# Patient Record
Sex: FEMALE | Race: WHITE | NOT HISPANIC OR LATINO | Employment: FULL TIME | URBAN - METROPOLITAN AREA
[De-identification: names, ages, dates, MRNs, and addresses within clinical notes are randomized per-mention and may not be internally consistent; named-entity substitution may affect disease eponyms.]

---

## 2017-07-25 DIAGNOSIS — E66.9 OBESITY: ICD-10-CM

## 2017-10-02 ENCOUNTER — ALLSCRIPTS OFFICE VISIT (OUTPATIENT)
Dept: OTHER | Facility: OTHER | Age: 32
End: 2017-10-02

## 2017-10-02 DIAGNOSIS — G43.109 MIGRAINE WITH AURA AND WITHOUT STATUS MIGRAINOSUS, NOT INTRACTABLE: ICD-10-CM

## 2017-10-03 NOTE — PROGRESS NOTES
Assessment  1  Complicated migraine (8 92) (H09 229)    Plan  Complicated migraine    · * MRI BRAIN WO CONTRAST; Status:Need Information - Financial Authorization; Requested JOANNA:[id number];    · 3 - Jarrell MARTINEZ, Aga Calvo  (Neurology) Co-Management  *  Status: Hold For - Scheduling   Requested for: [id number]  are Referring to a non- Preferred Provider : Services not provided in network  Care Summary provided  : Yes    Discussion/Summary    With her headache history, I am concerned about pseudotumor cerebri  I asked her to call her eye doctor  we will set on an MRI of her brain and have her see neurology  Possible side effects of new medications were reviewed with the patient/guardian today  The treatment plan was reviewed with the patient/guardian  The patient/guardian understands and agrees with the treatment plan      Chief Complaint  PT C/O HEADACHES FOR A COUPLE MONTHS      History of Present Illness  HPI: She c/o HA  They are increasing in severity and frequency  She has a migraine hx, but these are different  She has some vision changes and photophobia  She cannot identify a precipitating event  She take Excedrin with fair relief  She has no aura  She has some facial numbness associated with these  The location and quality of the pain is different  Review of Systems    Constitutional: No fever, no chills, feels well, no tiredness, no recent weight gain or loss  ENT: no ear ache, no loss of hearing, no nosebleeds or nasal discharge, no sore throat or hoarseness  Cardiovascular: no complaints of slow or fast heart rate, no chest pain, no palpitations, no leg claudication or lower extremity edema  Respiratory: no complaints of shortness of breath, no wheezing, no dyspnea on exertion, no orthopnea or PND  Breasts: no complaints of breast pain, breast lump or nipple discharge  Gastrointestinal: no complaints of abdominal pain, no constipation, no nausea or diarrhea, no vomiting, no bloody stools  Genitourinary: no complaints of dysuria, no incontinence, no pelvic pain, no dysmenorrhea, no vaginal discharge or abnormal vaginal bleeding  Musculoskeletal: no complaints of arthralgia, no myalgia, no joint swelling or stiffness, no limb pain or swelling  Integumentary: no complaints of skin rash or lesion, no itching or dry skin, no skin wounds  Neurological: as noted in HPI  Active Problems  1  Anxiety disorder (300 00) (F41 9)   2  Depression screen (V79 0) (Z13 89)   3  Fatigue (780 79) (R53 83)   4  Nontoxic single thyroid nodule (241 0) (E04 1)   5  Obesity (278 00) (E66 9)   6  Overweight (278 02) (E66 3)   7  Palpitations (785 1) (R00 2)   8  Shortness of breath (786 05) (R06 02)   9  Visual disturbances (368 9) (H53 9)   10  Wellness examination (V70 0) (Z00 00)    Past Medical History  1  History of Cough (786 2) (R05)   2  History of Denial Of Any Significant Medical History   3  History of Joint pain, knee (719 46) (M25 569)    Family History  Mother    1  Family history of Arthritis (V17 7)   2  Family history of Thyroid Cancer  Father    3  Family history of Arthritis (V17 7)  Maternal Aunt    4  Family history of Thyroid Cancer    Social History   · History of Current Every Day Smoker (305 1)   · Former smoker (C64 33) (Q35 920)    Surgical History  1  History of Lymphatic Surgery    Current Meds   1  Minastrin 24 Fe CHEW;   Therapy: (Recorded:28Mkl0909) to Recorded   2  Ventolin  (90 Base) MCG/ACT Inhalation Aerosol Solution; INHALE 1 TO 2   PUFFS EVERY 4 TO 6 HOURS AS NEEDED; Therapy: [id number] to (Last Rx:05Jun2015)  Requested for: 58VXU9520 Ordered    Allergies  1   No Known Drug Allergies    Vitals   Recorded: 45RZP1040 02:07PM   Heart Rate 91   Respiration 14   Systolic 693   Diastolic 68   Height 5 ft 6 in   Weight 199 lb 4 oz   BMI Calculated 32 16   BSA Calculated 2   O2 Saturation 97     Physical Exam    Constitutional   General appearance: No acute distress, well appearing and well nourished  Pulmonary   Respiratory effort: No increased work of breathing or signs of respiratory distress  Auscultation of lungs: Clear to auscultation  Cardiovascular   Auscultation of heart: Normal rate and rhythm, normal S1 and S2, without murmurs  Examination of extremities for edema and/or varicosities: Normal     Abdomen   Abdomen: Non-tender, no masses  Liver and spleen: No hepatomegaly or splenomegaly  Musculoskeletal   Gait and station: Normal     Digits and nails: Normal without clubbing or cyanosis  Inspection/palpation of joints, bones, and muscles: Normal     Neurologic   Cranial nerves: Cranial nerves 2-12 intact  Reflexes: 2+ and symmetric  Sensation: No sensory loss  Psychiatric   Orientation to person, place, and time: Normal     Mood and affect: Normal          Results/Data  PHQ-2 Adult Depression Screening [id number] 02:09PM User, [facility]     Test Name Result Flag Reference   PHQ-2 Adult Depression Score 0     Over the last two weeks, how often have you been bothered by any of the following problems?   Little interest or pleasure in doing things: Not at all - 0  Feeling down, depressed, or hopeless: Not at all - 0   PHQ-2 Adult Depression Screening Negative         Signatures   Electronically signed by : Sarai Keller MD; Oct  2 2017  2:36PM EST                       (Author)

## 2018-01-12 VITALS
WEIGHT: 199.25 LBS | RESPIRATION RATE: 14 BRPM | SYSTOLIC BLOOD PRESSURE: 122 MMHG | OXYGEN SATURATION: 97 % | HEIGHT: 66 IN | HEART RATE: 91 BPM | DIASTOLIC BLOOD PRESSURE: 68 MMHG | BODY MASS INDEX: 32.02 KG/M2

## 2018-04-18 RX ORDER — NORETHINDRONE ACETATE AND ETHINYL ESTRADIOL AND FERROUS FUMARATE 1MG-20(24)
1 KIT ORAL DAILY
Refills: 0 | COMMUNITY
Start: 2018-04-12 | End: 2020-04-16 | Stop reason: ALTCHOICE

## 2018-04-18 RX ORDER — ALBUTEROL SULFATE 90 UG/1
1-2 AEROSOL, METERED RESPIRATORY (INHALATION)
COMMUNITY
Start: 2015-06-05 | End: 2018-09-21

## 2018-04-20 ENCOUNTER — OFFICE VISIT (OUTPATIENT)
Dept: FAMILY MEDICINE CLINIC | Facility: CLINIC | Age: 33
End: 2018-04-20
Payer: COMMERCIAL

## 2018-04-20 VITALS
DIASTOLIC BLOOD PRESSURE: 80 MMHG | WEIGHT: 201.2 LBS | BODY MASS INDEX: 32.33 KG/M2 | SYSTOLIC BLOOD PRESSURE: 124 MMHG | OXYGEN SATURATION: 98 % | HEIGHT: 66 IN | HEART RATE: 92 BPM | RESPIRATION RATE: 16 BRPM

## 2018-04-20 DIAGNOSIS — Z23 NEED FOR HEPATITIS A IMMUNIZATION: Primary | ICD-10-CM

## 2018-04-20 DIAGNOSIS — Z71.84 TRAVEL ADVICE ENCOUNTER: Primary | ICD-10-CM

## 2018-04-20 PROCEDURE — 90632 HEPA VACCINE ADULT IM: CPT

## 2018-04-20 PROCEDURE — 3008F BODY MASS INDEX DOCD: CPT | Performed by: FAMILY MEDICINE

## 2018-04-20 PROCEDURE — 99213 OFFICE O/P EST LOW 20 MIN: CPT | Performed by: FAMILY MEDICINE

## 2018-04-20 PROCEDURE — 90471 IMMUNIZATION ADMIN: CPT

## 2018-04-20 RX ORDER — DOXYCYCLINE HYCLATE 100 MG/1
100 CAPSULE ORAL EVERY 12 HOURS SCHEDULED
Qty: 60 CAPSULE | Refills: 0 | Status: SHIPPED | OUTPATIENT
Start: 2018-04-20 | End: 2018-05-20

## 2018-04-20 RX ORDER — CHOLERA VACCINE, LIVE, ORAL 0.4B TO 2B
1 KIT ORAL ONCE
Qty: 1 PACKAGE | Refills: 0 | Status: SHIPPED | OUTPATIENT
Start: 2018-04-20 | End: 2018-04-20

## 2018-04-20 NOTE — PROGRESS NOTES
Assessment/Plan:    No problem-specific Assessment & Plan notes found for this encounter  Diagnoses and all orders for this visit:    Travel advice encounter  -     Cholera Vac Live Attenuated (Charli Hernandes) SUSR; Take 1 ampule by mouth once for 1 dose  -     typhoid live vaccine (VIVOTIF) DR capsule; Take 1 capsule by mouth every other day  -     doxycycline hyclate (VIBRAMYCIN) 100 mg capsule; Take 1 capsule (100 mg total) by mouth every 12 (twelve) hours for 30 days    Other orders  -     MELODETTA 24 FE 1-20 MG-MCG(24) CHEW; Chew 1 tablet daily Chew and swallow  -     albuterol (VENTOLIN HFA) 90 mcg/act inhaler; Inhale 1-2 puffs          Subjective:      Patient ID: Zhen Alvarez is a 34 y o  female  She will be traveling to [facility] in May as part of a mission trip  She is concerned about malaria prophylaxis as well as other infectious Disease  She feels well currently  She has no immunization records  The following portions of the patient's history were reviewed and updated as appropriate:   She  has no past medical history on file  She There are no active problems to display for this patient  She  has a past surgical history that includes Other surgical history  Her family history includes Arthritis in her father and mother; Thyroid cancer in her maternal aunt and mother  She  reports that she has been smoking  She has never used smokeless tobacco  She reports that she does not drink alcohol or use drugs    Current Outpatient Prescriptions   Medication Sig Dispense Refill    MELODETTA 24 FE 1-20 MG-MCG(24) CHEW Chew 1 tablet daily Chew and swallow  0    albuterol (VENTOLIN HFA) 90 mcg/act inhaler Inhale 1-2 puffs      Cholera Vac Live Attenuated (VAXCHORA) SUSR Take 1 ampule by mouth once for 1 dose 1 Package 0    doxycycline hyclate (VIBRAMYCIN) 100 mg capsule Take 1 capsule (100 mg total) by mouth every 12 (twelve) hours for 30 days 60 capsule 0    typhoid live vaccine (VIVOTIF)  capsule Take 1 capsule by mouth every other day 14 capsule 0     No current facility-administered medications for this visit  No current outpatient prescriptions on file prior to visit  No current facility-administered medications on file prior to visit  She has No Known Allergies       Review of Systems   All other systems reviewed and are negative  Objective:      /80 (BP Location: Right arm, Patient Position: Sitting, Cuff Size: Standard)   Pulse 92   Resp 16   Ht 5' 6" (1 676 m)   Wt 91 3 kg (201 lb 3 2 oz)   SpO2 98%   BMI 32 47 kg/m²          Physical Exam   Constitutional: She is oriented to person, place, and time  She appears well-developed and well-nourished  Neck: Normal range of motion  Neck supple  Cardiovascular: Normal rate, regular rhythm, normal heart sounds and intact distal pulses  Pulmonary/Chest: Effort normal and breath sounds normal    Abdominal: Soft  Bowel sounds are normal    Musculoskeletal: Normal range of motion  Neurological: She is alert and oriented to person, place, and time  She has normal reflexes  Skin: Skin is warm and dry  Psychiatric: She has a normal mood and affect  Her behavior is normal  Judgment and thought content normal    Nursing note and vitals reviewed

## 2018-04-20 NOTE — PATIENT INSTRUCTIONS
Doxycycline (By mouth)   Doxycycline (oyq-k-FOG-kleen)  Treats and prevents infections  Also used to prevent malaria and treat rosacea or severe acne  This medicine is a tetracycline antibiotic  Brand Name(s): Acticlate, Adoxa, Adoxa Papo 1/150, Avidoxy, Avidoxy DK, BenzoDox 30 Kit, BenzoDox 60 Kit, Doryx, Doryx MPC, Monodox, Morgidox [id number], Morgidox [id number] Kit, Morgidox 1x50MG, Morgidox 1x50MG Kit, Morgidox [id number]   There may be other brand names for this medicine  When This Medicine Should Not Be Used: This medicine is not right for everyone  Do not use it if you had an allergic reaction to doxycycline or another tetracycline antibiotic, or if you are pregnant or breastfeeding  How to Use This Medicine:   Capsule, Delayed Release Capsule, Long Acting Capsule, Liquid, Tablet, Delayed Release Tablet  · Your doctor will tell you how much medicine to use  Do not use more than directed  · Ask your pharmacist or doctor if you need to take this medicine with or without food  Some forms can be taken with food or milk, but others must be taken on an empty stomach  · Tablets: You may take this medicine with food or milk to avoid stomach irritation  To break a tablet, hold the tablet between your thumb and index fingers close to the appropriate scored line  Then, apply enough pressure to snap the tablet segments apart  Do not use the tablet if it does not break on the scored lines  · Delayed-release tablets: You may also take this medicine by sprinkling the broken tablets onto room-temperature applesauce  Swallow this mixture right away; do not chew  Do not store the mixture for later use  · Oracea® capsules: This medicine must be taken on an empty stomach, at least 1 hour before or 2 hours after a meal   · Capsule: Swallow whole  Do not break, crush, chew, or open it  · Oral liquid: Shake the bottle well just before each use   Measure the oral liquid medicine with a marked measuring spoon, oral syringe, or medicine cup  · Take all of the medicine in your prescription to clear up your infection, even if you feel better after the first few doses  · Drink plenty of fluids to avoid throat problems, if you take the capsule or tablet form  · Malaria prevention: Start taking the medicine 1 or 2 days before you travel  Take the medicine every day during your trip  Keep taking it for 4 weeks after you return  However, do not use the medicine for longer than 4 months  · Do not use this medicine for more than 9 months if you are using it for rosacea  · Use only the brand of medicine your doctor prescribed  Other brands may not work the same way  · Read and follow the patient instructions that come with this medicine  Talk to your doctor or pharmacist if you have any questions  · Missed dose: Take a dose as soon as you remember  If it is almost time for your next dose, wait until then and take a regular dose  Do not take extra medicine to make up for a missed dose  · Store the medicine in a closed container at room temperature, away from heat, moisture, and direct light  Do not freeze the oral liquid  Drugs and Foods to Avoid:   Ask your doctor or pharmacist before using any other medicine, including over-the-counter medicines, vitamins, and herbal products  · Some foods and medicines can affect how doxycycline works  Tell your doctor if you are using any of the following:  ¨ Bismuth subsalicylate, isotretinoin or other acne medicines, acitretin or other medicine to treat psoriasis  ¨ Penicillin antibiotic, birth control pills, medicine for seizures (such as carbamazepine, phenobarbital, phenytoin), stomach medicine, a blood thinner (such as warfarin), or any medicine that contains aluminum, calcium, or iron (such an antacid or vitamin supplement)  Warnings While Using This Medicine:   · This medicine may cause birth defects if either partner is using it during conception or pregnancy   Tell your doctor right away if you or your partner becomes pregnant  Birth control pills may not work as well when used with this medicine  Use a second form of birth control to keep from getting pregnant  · Tell your doctor if you have kidney disease, liver disease, asthma, or an allergy to sulfites  Tell your doctor if you had surgery on your stomach, or if you have a history of yeast infections  · This medicine may cause the following problems:  ¨ Permanent change in tooth color (in children younger than 6years old)  ¨ Increased pressure inside the head  ¨ Yeast infection  ¨ Immune system problems  · This medicine can cause diarrhea  Call your doctor if the diarrhea becomes severe, does not stop, or is bloody  Do not take any medicine to stop diarrhea until you have talked to your doctor  Diarrhea can occur 2 months or more after you stop taking this medicine  · This medicine may make your skin more sensitive to sunlight  Wear sunscreen  Do not use sunlamps or tanning beds  · Tell any doctor or dentist who treats you that you are using this medicine  This medicine may affect certain medical test results  · Call your doctor if your symptoms do not improve or if they get worse  · Keep all medicine out of the reach of children  Never share your medicine with anyone    Possible Side Effects While Using This Medicine:   Call your doctor right away if you notice any of these side effects:  · Allergic reaction: Itching or hives, swelling in your face or hands, swelling or tingling in your mouth or throat, chest tightness, trouble breathing  · Blistering, peeling, red skin rash  · Burning, pain, or irritation in your upper stomach or throat  · Diarrhea that may contain blood  · Fever, chills, cough, runny or stuffy nose, sore throat, and body aches  · Joint pain, fever, rash, and unusual tiredness or weakness  · Severe headache, dizziness, or vision changes  If you notice these less serious side effects, talk with your doctor:   · Darkening of your skin, scars, teeth, or gums  · Sores or white patches on your lips, mouth, or throat  If you notice other side effects that you think are caused by this medicine, tell your doctor  Call your doctor for medical advice about side effects  You may report side effects to FDA at 1-918-FDA-5396  © 2017 [employer] Information is for End User's use only and may not be sold, redistributed or otherwise used for commercial purposes  The above information is an  only  It is not intended as medical advice for individual conditions or treatments  Talk to your doctor, nurse or pharmacist before following any medical regimen to see if it is safe and effective for you

## 2018-09-21 ENCOUNTER — OFFICE VISIT (OUTPATIENT)
Dept: FAMILY MEDICINE CLINIC | Facility: CLINIC | Age: 33
End: 2018-09-21
Payer: COMMERCIAL

## 2018-09-21 VITALS
OXYGEN SATURATION: 99 % | SYSTOLIC BLOOD PRESSURE: 112 MMHG | HEART RATE: 69 BPM | RESPIRATION RATE: 16 BRPM | HEIGHT: 66 IN | TEMPERATURE: 97.9 F | DIASTOLIC BLOOD PRESSURE: 64 MMHG | WEIGHT: 207 LBS | BODY MASS INDEX: 33.27 KG/M2

## 2018-09-21 DIAGNOSIS — N39.0 URINARY TRACT INFECTION WITHOUT HEMATURIA, SITE UNSPECIFIED: ICD-10-CM

## 2018-09-21 DIAGNOSIS — R53.83 FATIGUE, UNSPECIFIED TYPE: ICD-10-CM

## 2018-09-21 DIAGNOSIS — Z00.00 ENCOUNTER FOR MEDICAL EXAMINATION TO ESTABLISH CARE: ICD-10-CM

## 2018-09-21 DIAGNOSIS — G43.909 MIGRAINE WITHOUT STATUS MIGRAINOSUS, NOT INTRACTABLE, UNSPECIFIED MIGRAINE TYPE: Primary | ICD-10-CM

## 2018-09-21 PROBLEM — G43.109 COMPLICATED MIGRAINE: Status: ACTIVE | Noted: 2017-10-02

## 2018-09-21 PROCEDURE — 99204 OFFICE O/P NEW MOD 45 MIN: CPT | Performed by: FAMILY MEDICINE

## 2018-09-21 RX ORDER — SULFAMETHOXAZOLE AND TRIMETHOPRIM 800; 160 MG/1; MG/1
1 TABLET ORAL EVERY 12 HOURS SCHEDULED
Qty: 14 TABLET | Refills: 0 | Status: SHIPPED | OUTPATIENT
Start: 2018-09-21 | End: 2018-09-28

## 2018-09-21 RX ORDER — BUTALBITAL, ACETAMINOPHEN AND CAFFEINE 50; 325; 40 MG/1; MG/1; MG/1
1 TABLET ORAL EVERY 6 HOURS PRN
Qty: 30 TABLET | Refills: 0 | Status: SHIPPED | OUTPATIENT
Start: 2018-09-21 | End: 2018-10-21

## 2018-09-21 NOTE — PROGRESS NOTES
[address]       NAME: Tina Kimble is a 34 y o  female  : 1985    MRN: [id number]  DATE: 2018  TIME: 10:14 AM    Assessment and Plan   Diagnoses and all orders for this visit:    Migraine without status migrainosus, not intractable, unspecified migraine type  -     butalbital-acetaminophen-caffeine (FIORICET,ESGIC) -40 mg per tablet; Take 1 tablet by mouth every 6 (six) hours as needed for migraine for up to 30 days    Encounter for medical examination to establish care    Urinary tract infection without hematuria, site unspecified  -     sulfamethoxazole-trimethoprim (BACTRIM DS) 800-160 mg per tablet; Take 1 tablet by mouth every 12 (twelve) hours for 7 days    Fatigue, unspecified type  -     TSH, 3rd generation with Free T4 reflex; Future  -     HEMOGLOBIN A1C W/ EAG ESTIMATION; Future        No problem-specific Assessment & Plan notes found for this encounter  Patient Instructions   Reviewed UA results from 1 week ago which did show UTI will treat with bactrim   Will try fiorcet for chronic migraines    ordered further labs to eval fatigue and follow up in 6 months        Chief Complaint     Chief Complaint   Patient presents with    Follow-up     review labs         History of Present Illness       34 yea old female at office with chief complaint of increased fatigue for the past few months  She does have a history of migraiens she states she will get them 2-3 times per month she had been using Excedrin migaine which is no longer working for her at this time  Did bring in lab results from her employer and wanted to review them at this time, she has a U/A CBC and CMP available for review  Migraine    This is a chronic problem  The current episode started more than 1 year ago  The problem occurs intermittently  The problem has been waxing and waning  The pain is located in the right unilateral region  The pain does not radiate   The pain quality is similar to prior headaches  The quality of the pain is described as shooting  The pain is at a severity of 0/10  The patient is experiencing no pain  Associated symptoms include blurred vision, dizziness, nausea, phonophobia and photophobia  Pertinent negatives include no abdominal pain, abnormal behavior, anorexia, back pain, coughing, drainage, ear pain, eye pain, eye redness, eye watering, facial sweating, fever, hearing loss, insomnia, loss of balance, muscle aches, neck pain, numbness, rhinorrhea, scalp tenderness, seizures, sinus pressure, sore throat, swollen glands, tingling, tinnitus, visual change, vomiting, weakness or weight loss  Nothing aggravates the symptoms  She has tried NSAIDs for the symptoms  The treatment provided mild relief  Fatigue   This is a new problem  The current episode started more than 1 month ago  The problem occurs intermittently  The problem has been waxing and waning  Associated symptoms include fatigue, headaches and nausea  Pertinent negatives include no abdominal pain, anorexia, arthralgias, change in bowel habit, chest pain, chills, congestion, coughing, diaphoresis, fever, joint swelling, myalgias, neck pain, numbness, rash, sore throat, swollen glands, urinary symptoms, vertigo, visual change, vomiting or weakness  Nothing aggravates the symptoms  She has tried nothing for the symptoms  The treatment provided no relief  Review of Systems   Review of Systems   Constitutional: Positive for fatigue  Negative for chills, diaphoresis, fever and weight loss  HENT: Negative  Negative for congestion, ear pain, hearing loss, rhinorrhea, sinus pressure, sore throat and tinnitus  Eyes: Positive for blurred vision and photophobia  Negative for pain and redness  Respiratory: Negative  Negative for cough  Cardiovascular: Negative  Negative for chest pain  Gastrointestinal: Positive for nausea  Negative for abdominal pain, anorexia, change in bowel habit and vomiting  Endocrine: Negative  Genitourinary: Negative  Musculoskeletal: Negative  Negative for arthralgias, back pain, joint swelling, myalgias and neck pain  Skin: Negative  Negative for rash  Allergic/Immunologic: Negative  Neurological: Positive for dizziness and headaches  Negative for vertigo, tingling, seizures, weakness, numbness and loss of balance  Hematological: Negative  Psychiatric/Behavioral: Negative  The patient does not have insomnia  All other systems reviewed and are negative  Current Medications       Current Outpatient Prescriptions:     butalbital-acetaminophen-caffeine (FIORICET,ESGIC) -40 mg per tablet, Take 1 tablet by mouth every 6 (six) hours as needed for migraine for up to 30 days, Disp: 30 tablet, Rfl: 0    MELODETTA 24 FE 1-20 MG-MCG(24) CHEW, Chew 1 tablet daily Chew and swallow, Disp: , Rfl: 0    sulfamethoxazole-trimethoprim (BACTRIM DS) 800-160 mg per tablet, Take 1 tablet by mouth every 12 (twelve) hours for 7 days, Disp: 14 tablet, Rfl: 0    Current Allergies     Allergies as of 09/21/2018    (No Known Allergies)            The following portions of the patient's history were reviewed and updated as appropriate: allergies, current medications, past family history, past medical history, past social history, past surgical history and problem list      No past medical history on file  Past Surgical History:   Procedure Laterality Date    OTHER SURGICAL HISTORY      Lymphatic surgery       Family History   Problem Relation Age of Onset    Arthritis Mother     Thyroid cancer Mother     Arthritis Father     Thyroid cancer Maternal Aunt          Medications have been verified  Objective   /64   Pulse 69   Temp 97 9 °F (36 6 °C)   Resp 16   Ht 5' 6" (1 676 m)   Wt 93 9 kg (207 lb)   SpO2 99%   BMI 33 41 kg/m²        Physical Exam     Physical Exam   Constitutional: She is oriented to person, place, and time   Vital signs are normal  She appears well-developed  HENT:   Head: Normocephalic and atraumatic  Right Ear: External ear normal    Left Ear: External ear normal    Nose: Nose normal    Mouth/Throat: Oropharynx is clear and moist    Eyes: Conjunctivae and EOM are normal  Pupils are equal, round, and reactive to light  Lids are everted and swept, no foreign bodies found  Neck: Normal range of motion  Neck supple  Cardiovascular: Normal rate, regular rhythm, normal heart sounds and intact distal pulses  Pulmonary/Chest: Effort normal and breath sounds normal    Abdominal: Soft  Normal appearance and bowel sounds are normal    Musculoskeletal: Normal range of motion  Neurological: She is alert and oriented to person, place, and time  She has normal reflexes  Skin: Skin is warm, dry and intact  Psychiatric: She has a normal mood and affect  Her speech is normal and behavior is normal  Judgment and thought content normal    Nursing note and vitals reviewed

## 2019-04-12 ENCOUNTER — OFFICE VISIT (OUTPATIENT)
Dept: FAMILY MEDICINE CLINIC | Facility: CLINIC | Age: 34
End: 2019-04-12
Payer: COMMERCIAL

## 2019-04-12 VITALS
TEMPERATURE: 99.3 F | OXYGEN SATURATION: 98 % | BODY MASS INDEX: 35.68 KG/M2 | HEART RATE: 83 BPM | HEIGHT: 66 IN | WEIGHT: 222 LBS | RESPIRATION RATE: 18 BRPM | SYSTOLIC BLOOD PRESSURE: 119 MMHG | DIASTOLIC BLOOD PRESSURE: 72 MMHG

## 2019-04-12 DIAGNOSIS — Z09 FOLLOW UP: ICD-10-CM

## 2019-04-12 DIAGNOSIS — G43.109 COMPLICATED MIGRAINE: Primary | ICD-10-CM

## 2019-04-12 PROCEDURE — 99213 OFFICE O/P EST LOW 20 MIN: CPT | Performed by: FAMILY MEDICINE

## 2020-02-13 ENCOUNTER — TELEPHONE (OUTPATIENT)
Dept: FAMILY MEDICINE CLINIC | Facility: CLINIC | Age: 35
End: 2020-02-13

## 2020-02-14 ENCOUNTER — TELEPHONE (OUTPATIENT)
Dept: FAMILY MEDICINE CLINIC | Facility: CLINIC | Age: 35
End: 2020-02-14

## 2020-02-14 NOTE — TELEPHONE ENCOUNTER
Patient called [facility] requesting an appointment due to she was in the hospital yesterday diagnosed with TIA, states she signed herself out against Medical Advice  Informed the patient that she would need to contact her PCP office at [address] on Monday when the office is open to schedule follow up appointment

## 2020-02-24 ENCOUNTER — OFFICE VISIT (OUTPATIENT)
Dept: FAMILY MEDICINE CLINIC | Facility: CLINIC | Age: 35
End: 2020-02-24
Payer: COMMERCIAL

## 2020-02-24 VITALS
RESPIRATION RATE: 18 BRPM | HEART RATE: 94 BPM | WEIGHT: 220 LBS | BODY MASS INDEX: 35.36 KG/M2 | OXYGEN SATURATION: 98 % | SYSTOLIC BLOOD PRESSURE: 128 MMHG | HEIGHT: 66 IN | TEMPERATURE: 99.4 F | DIASTOLIC BLOOD PRESSURE: 96 MMHG

## 2020-02-24 DIAGNOSIS — F41.8 DEPRESSION WITH ANXIETY: Primary | ICD-10-CM

## 2020-02-24 DIAGNOSIS — G43.009 MIGRAINE WITHOUT AURA AND WITHOUT STATUS MIGRAINOSUS, NOT INTRACTABLE: ICD-10-CM

## 2020-02-24 PROCEDURE — 99214 OFFICE O/P EST MOD 30 MIN: CPT | Performed by: FAMILY MEDICINE

## 2020-02-24 RX ORDER — ESCITALOPRAM OXALATE 10 MG/1
10 TABLET ORAL DAILY
Qty: 30 TABLET | Refills: 2 | Status: SHIPPED | OUTPATIENT
Start: 2020-02-24 | End: 2020-03-09 | Stop reason: ALTCHOICE

## 2020-02-24 NOTE — PROGRESS NOTES
Assessment/Plan:     Diagnoses and all orders for this visit:    Depression with anxiety  -     Ambulatory referral to [facility]; Future  -     escitalopram (LEXAPRO) 10 mg tablet; Take 1 tablet (10 mg total) by mouth daily    Migraine without aura and without status migrainosus, not intractable  -     Ambulatory referral to Neurology; Future    Other orders  -     Cancel: TDAP Vaccine greater than or equal to 8yo  -     Cancel: influenza vaccine, [id number], quadrivalent, 0 5 mL, preservative-free, for adult and pediatric patients 6 mos+ (AFLURIA, FLUARIX, FLULAVAL, FLUZONE)        - Imaging and labs from hospital reviewed with no significant findings  Patient hesitant to start any medication for migraines  Will refer to neurology for further workup and management of migraines and possible TIA  - Recommended ibuprofen or tylenol as needed for headaches  - Will start Lexapro 10mg daily for depression/anxiety  Referral given for counseling  Follow up in one month  - Patient declines vaccines today  Return in about 1 month (around 3/24/2020) for Next scheduled follow up  Subjective:        Patient ID: Ned Gerard is a 30 y o  female  Chief Complaint   Patient presents with    Follow-up     increased fatigue and headaches since hospital visit   Anxiety     increased in last 6 months       Laura Boyle is a 30year old female with history of migraines and anxiety, presenting for hospital follow up  Patient was seen at [facility] on on 2/13 for LUE numbness and blurred vision with suspected TIA, however, she signed out AMA  Imaging including EKG, CT head, MRI head, and CXR were normal   Bilateral carotid US showed less than 50% stenosis  History of migraines occuring about 1x weekly  Migraines are associated with nausea and facial numbness/tingling  Denies photophobia, phonophobia  Denies aura  Takes ibuprofen or Excedrin as needed    Was offered Fioricet in the past but did not take this due to fear of side effects  Today patient is concerned for increasing anxiety over the past 6 months  Associated symptoms include over-thinking, palpitations and chest tightness, and increased fatigue  States there has been a lot of death in her family over the past few months  She has been feeling more fatigued and takes 2-3 naps per day  Denies appetite change  Has not been on medication for anxiety or depression in the past   Denies SI/HI  The following portions of the patient's history were reviewed and updated as appropriate: allergies, current medications, past family history, past medical history, past social history, past surgical history and problem list     Patient Active Problem List   Diagnosis    Anxiety disorder    Complicated migraine    Fatigue    Nontoxic single thyroid nodule    Class 2 obesity due to excess calories without serious comorbidity with body mass index (BMI) of 35 0 to 35 9 in adult    Palpitations    Visual disturbances       Current Outpatient Medications   Medication Sig Dispense Refill    MELODETTA 24 FE 1-20 MG-MCG(24) CHEW Chew 1 tablet daily Chew and swallow  0    escitalopram (LEXAPRO) 10 mg tablet Take 1 tablet (10 mg total) by mouth daily 30 tablet 2     No current facility-administered medications for this visit           Past Medical History:   Diagnosis Date    Disease of thyroid gland         Past Surgical History:   Procedure Laterality Date    OTHER SURGICAL HISTORY      Lymphatic surgery        Social History     Socioeconomic History    Marital status: Unknown     Spouse name: Not on file    Number of children: Not on file    Years of education: Not on file    Highest education level: Not on file   Occupational History    Not on file   Social Needs    Financial resource strain: Not on file    Food insecurity:     Worry: Not on file     Inability: Not on file    Transportation needs:     Medical: Not on file     Non-medical: Not on file   Tobacco Use    Smoking status: Current Every Day Smoker     Packs/day: 0 50     Types: Cigarettes    Smokeless tobacco: Never Used    Tobacco comment: per allscripts; former smoker   Substance and Sexual Activity    Alcohol use: Not Currently     Comment: social    Drug use: No    Sexual activity: Yes   Lifestyle    Physical activity:     Days per week: Not on file     Minutes per session: Not on file    Stress: Not on file   Relationships    Social connections:     Talks on phone: Not on file     Gets together: Not on file     Attends [employer] service: Not on file     Active member of club or organization: Not on file     Attends meetings of clubs or organizations: Not on file     Relationship status: Not on file    Intimate partner violence:     Fear of current or ex partner: Not on file     Emotionally abused: Not on file     Physically abused: Not on file     Forced sexual activity: Not on file   Other Topics Concern    Not on file   Social History Narrative    Not on file        Review of Systems   Constitutional: Positive for fatigue  Negative for appetite change, chills, diaphoresis, fever and unexpected weight change  HENT: Negative for congestion, ear pain, postnasal drip, rhinorrhea, sinus pressure, sinus pain, sore throat and trouble swallowing  Eyes: Negative for photophobia, pain, discharge, redness, itching and visual disturbance  Respiratory: Negative for cough, chest tightness, shortness of breath and wheezing  Cardiovascular: Negative for chest pain, palpitations and leg swelling  Gastrointestinal: Negative for abdominal pain, constipation, diarrhea, nausea and vomiting  Genitourinary: Negative for difficulty urinating, dysuria and hematuria  Musculoskeletal: Negative for arthralgias, back pain, gait problem, joint swelling, myalgias, neck pain and neck stiffness  Skin: Negative for rash and wound  Neurological: Positive for headaches   Negative for dizziness, syncope, weakness, light-headedness and numbness  Psychiatric/Behavioral: Positive for decreased concentration, dysphoric mood and sleep disturbance  Negative for hallucinations, self-injury and suicidal ideas  The patient is nervous/anxious and is hyperactive  Objective:      /96 (BP Location: Left arm, Patient Position: Sitting, Cuff Size: Adult)   Pulse 94   Temp 99 4 °F (37 4 °C) (Temporal)   Resp 18   Ht 5' 6" (1 676 m)   Wt 99 8 kg (220 lb)   SpO2 98%   BMI 35 51 kg/m²          Physical Exam   Constitutional: She is oriented to person, place, and time  She appears well-developed and well-nourished  No distress  Tearful   HENT:   Head: Normocephalic and atraumatic  Eyes: Pupils are equal, round, and reactive to light  Conjunctivae and EOM are normal    Neck: Normal range of motion  Neck supple  Cardiovascular: Normal rate, regular rhythm and normal heart sounds  No murmur heard  Pulmonary/Chest: Effort normal and breath sounds normal  No respiratory distress  She has no wheezes  Musculoskeletal: Normal range of motion  She exhibits no edema  Lymphadenopathy:     She has no cervical adenopathy  Neurological: She is alert and oriented to person, place, and time  No cranial nerve deficit or sensory deficit  Coordination normal    Skin: Skin is warm and dry  Nursing note and vitals reviewed

## 2020-03-09 ENCOUNTER — OFFICE VISIT (OUTPATIENT)
Dept: FAMILY MEDICINE CLINIC | Facility: CLINIC | Age: 35
End: 2020-03-09
Payer: COMMERCIAL

## 2020-03-09 VITALS
WEIGHT: 222 LBS | BODY MASS INDEX: 35.68 KG/M2 | OXYGEN SATURATION: 97 % | TEMPERATURE: 99.1 F | RESPIRATION RATE: 18 BRPM | SYSTOLIC BLOOD PRESSURE: 118 MMHG | HEIGHT: 66 IN | HEART RATE: 94 BPM | DIASTOLIC BLOOD PRESSURE: 76 MMHG

## 2020-03-09 DIAGNOSIS — Z86.39 HISTORY OF HASHIMOTO THYROIDITIS: ICD-10-CM

## 2020-03-09 DIAGNOSIS — F41.9 ANXIETY DISORDER, UNSPECIFIED TYPE: Primary | ICD-10-CM

## 2020-03-09 PROCEDURE — 87389 HIV-1 AG W/HIV-1&-2 AB AG IA: CPT | Performed by: FAMILY MEDICINE

## 2020-03-09 PROCEDURE — 84443 ASSAY THYROID STIM HORMONE: CPT | Performed by: FAMILY MEDICINE

## 2020-03-09 PROCEDURE — 84481 FREE ASSAY (FT-3): CPT | Performed by: FAMILY MEDICINE

## 2020-03-09 PROCEDURE — 84439 ASSAY OF FREE THYROXINE: CPT | Performed by: FAMILY MEDICINE

## 2020-03-09 PROCEDURE — 99213 OFFICE O/P EST LOW 20 MIN: CPT | Performed by: FAMILY MEDICINE

## 2020-03-09 RX ORDER — SERTRALINE HYDROCHLORIDE 25 MG/1
25 TABLET, FILM COATED ORAL DAILY
Qty: 30 TABLET | Refills: 1 | Status: SHIPPED | OUTPATIENT
Start: 2020-03-09 | End: 2020-04-09

## 2020-03-09 RX ORDER — HYDROXYZINE HYDROCHLORIDE 10 MG/1
10 TABLET, FILM COATED ORAL 3 TIMES DAILY PRN
Qty: 90 TABLET | Refills: 0 | Status: SHIPPED | OUTPATIENT
Start: 2020-03-09 | End: 2021-12-09 | Stop reason: ALTCHOICE

## 2020-03-09 NOTE — PROGRESS NOTES
Assessment/Plan:     Diagnoses and all orders for this visit:    Anxiety disorder, unspecified type  -     HIV 1/2 Antigen/Antibody (4th Generation) w Reflex [facility]  -     hydrOXYzine HCL (ATARAX) 10 mg tablet; Take 1 tablet (10 mg total) by mouth 3 (three) times a day as needed for anxiety  -     TSH, 3rd generation  -     T3, free  -     T4, free  -     Thyroid Antibodies Panel  -     sertraline (ZOLOFT) 25 mg tablet; Take 1 tablet (25 mg total) by mouth daily    History of Hashimoto thyroiditis  -     TSH, 3rd generation  -     T3, free  -     T4, free  -     Thyroid Antibodies Panel    Other orders  -     Cancel: Tdap vaccine greater than or equal to 6yo IM           Start zoloft 25mg daily with hydroxyzine PRN acute sx  Supportive measures discussed  Awaiting therapy apt  FMLA forms completed  Check thyroid labs and F/U neurology as scheduled  Return in about 3 weeks (around 3/30/2020), or if symptoms worsen or fail to improve, for follow up as previously scheduled, and follow up with specialist as directed  Subjective:        Patient ID: Chandrika Gunter is a 30 y o  female  Chief Complaint   Patient presents with   Amber Cho / [employer] paperwork       Patient is a 30year old female who presents to the office today for anxiety follow up  She has been experiencing worsening anxiety and stress since September, experiences chest tightness, palpitations, over thinking, tense, stressed  She denies feeling down, depressed, hopeless  She reports constant fatigue, tearfulness, decreased concentration  She denies SI/HI/AH/VH/manic sx  She reports feeling overwhelmed and does not feel like herself  She reports sleeping constantly, hypersomnia  She reports appetite is decreased, eating once/day  She works at [employer], and has been unable to work due to her symptoms  She reports high anxiety, cannot concentrate  She has been unable to plan projects or coordinate objectives for work   She is excessively fatigued, crying all the time  She is awaiting apt for therapy  Her symptoms have worsened since her hospital visit for possible TIA, worrying about having a stroke  She took the lexapro and reports it did not agree with her  She reports that medication "calmed her down way too much " She reports she was having decreased heart rate and irregular heart beat  The following portions of the patient's history were reviewed and updated as appropriate: allergies, current medications, past family history, past medical history, past social history, past surgical history and problem list      Patient Active Problem List   Diagnosis    Anxiety disorder    Complicated migraine    Fatigue    Nontoxic single thyroid nodule    Class 2 obesity due to excess calories without serious comorbidity with body mass index (BMI) of 35 0 to 35 9 in adult    Palpitations    Visual disturbances       Current Outpatient Medications   Medication Sig Dispense Refill    MELODETTA 24 FE 1-20 MG-MCG(24) CHEW Chew 1 tablet daily Chew and swallow  0    hydrOXYzine HCL (ATARAX) 10 mg tablet Take 1 tablet (10 mg total) by mouth 3 (three) times a day as needed for anxiety 90 tablet 0    sertraline (ZOLOFT) 25 mg tablet Take 1 tablet (25 mg total) by mouth daily 30 tablet 1     No current facility-administered medications for this visit           Past Medical History:   Diagnosis Date    Disease of thyroid gland         Past Surgical History:   Procedure Laterality Date    OTHER SURGICAL HISTORY      Lymphatic surgery        Social History     Socioeconomic History    Marital status: Unknown     Spouse name: Not on file    Number of children: Not on file    Years of education: Not on file    Highest education level: Not on file   Occupational History    Not on file   Social Needs    Financial resource strain: Not on file    Food insecurity:     Worry: Not on file     Inability: Not on file    Transportation needs: Medical: Not on file     Non-medical: Not on file   Tobacco Use    Smoking status: Current Every Day Smoker     Packs/day: 0 50     Types: Cigarettes    Smokeless tobacco: Never Used    Tobacco comment: per allscripts; former smoker   Substance and Sexual Activity    Alcohol use: Not Currently     Comment: social    Drug use: No    Sexual activity: Yes   Lifestyle    Physical activity:     Days per week: Not on file     Minutes per session: Not on file    Stress: Not on file   Relationships    Social connections:     Talks on phone: Not on file     Gets together: Not on file     Attends [employer] service: Not on file     Active member of club or organization: Not on file     Attends meetings of clubs or organizations: Not on file     Relationship status: Not on file    Intimate partner violence:     Fear of current or ex partner: Not on file     Emotionally abused: Not on file     Physically abused: Not on file     Forced sexual activity: Not on file   Other Topics Concern    Not on file   Social History Narrative    Not on file        Review of Systems   Constitutional: Positive for appetite change and fatigue  Negative for activity change, chills, diaphoresis, fever and unexpected weight change  Respiratory: Negative  Cardiovascular: Positive for palpitations  Negative for chest pain and leg swelling  Gastrointestinal: Negative  Psychiatric/Behavioral: Positive for decreased concentration, dysphoric mood and sleep disturbance  Negative for agitation, behavioral problems, confusion, hallucinations, self-injury and suicidal ideas  The patient is nervous/anxious  The patient is not hyperactive            Objective:      /76 (BP Location: Left arm, Patient Position: Sitting, Cuff Size: Large)   Pulse 94   Temp 99 1 °F (37 3 °C) (Temporal)   Resp 18   Ht 5' 6" (1 676 m)   Wt 101 kg (222 lb)   SpO2 97%   BMI 35 83 kg/m²          Physical Exam   Constitutional: She is oriented to person, place, and time  She appears well-developed and well-nourished  HENT:   Head: Normocephalic and atraumatic  Eyes: Pupils are equal, round, and reactive to light  Conjunctivae are normal    Neck: Neck supple  Cardiovascular: Normal rate, regular rhythm and normal heart sounds  Pulmonary/Chest: Effort normal and breath sounds normal    Neurological: She is alert and oriented to person, place, and time  Skin: Skin is warm and dry  Capillary refill takes less than 2 seconds  Psychiatric: Her speech is normal and behavior is normal  Judgment and thought content normal  Her mood appears anxious

## 2020-03-10 ENCOUNTER — TELEPHONE (OUTPATIENT)
Dept: BEHAVIORAL/MENTAL HEALTH CLINIC | Facility: HOME HEALTHCARE | Age: 35
End: 2020-03-10

## 2020-03-10 LAB
T3FREE SERPL-MCNC: 3.02 PG/ML (ref 2.3–4.2)
T4 FREE SERPL-MCNC: 1.03 NG/DL (ref 0.76–1.46)
TSH SERPL DL<=0.05 MIU/L-ACNC: 0.86 UIU/ML (ref 0.36–3.74)

## 2020-03-11 LAB — HIV 1+2 AB+HIV1 P24 AG SERPL QL IA: NORMAL

## 2020-03-30 ENCOUNTER — TELEMEDICINE (OUTPATIENT)
Dept: FAMILY MEDICINE CLINIC | Facility: HOME HEALTHCARE | Age: 35
End: 2020-03-30

## 2020-03-30 DIAGNOSIS — F41.9 ANXIETY DISORDER, UNSPECIFIED TYPE: Primary | ICD-10-CM

## 2020-03-30 NOTE — PROGRESS NOTES
Virtual Brief Visit    Problem List Items Addressed This Visit        Other    Anxiety disorder - Primary        - Continue Zoloft 25 mg daily   - Follow up with neurology as scheduled  - Patient given [place] fax number and advised to send [facility] paperwork to be reviewed  - Follow up in 1 month  Reason for visit is follow up anxiety  Encounter provider Ayde Shook PA-C    Provider located at [address] [address]  [phone]      Recent Visits  No visits were found meeting these conditions  Showing recent visits within past 7 days and meeting all other requirements     Today's Visits  Date Type Provider Dept   03/30/20 [address] [place] [address], JB Mcnulty [facility] today's visits and meeting all other requirements     Future Appointments  Date Type Provider Dept   03/30/20 Telemedicine JB Tello 1   Showing future appointments within next 150 days and meeting all other requirements        After connecting through telephone and patient was informed that this is not a secure, HIPAA-complaint platform  she agrees to proceed  , the patient was identified by name and date of birth  Savannah Claudio was informed that this is a telemedicine visit and that the visit is being conducted through telephone and patient was informed that this is not a secure, HIPAA-complaint platform  she agrees to proceed     My office door was closed  No one else was in the room  She acknowledged consent and understanding of privacy and security of the video platform  The patient has agreed to participate and understands they can discontinue the visit at any time  Patient is aware this is a billable service  Subjective  Savannah Claudio is a 30 y o  female presenting for follow up of anxiety  Patient was initially started on lexapro on 2/24 but stopped this medication due to irregular heart beat  Was then switched to Zoloft 25mg daily on 3/9  Reports improvement of anxiety symptoms since starting the medication  Denies irregular heart beat or chest pain with this medication  Patient saw neurology on 3/23 for follow up of suspected TIA in February  Neuro recommended daily aspirin 81mg and ordered additional testing to include home sleep study, MRI brain, MRA neck, and ECHO to rule out underlying cause  Patient is requesting an extension of her FMLA due to the additional testing she needs to obtain per neuro  Past Medical History:   Diagnosis Date    Disease of thyroid gland        Past Surgical History:   Procedure Laterality Date    OTHER SURGICAL HISTORY      Lymphatic surgery       Current Outpatient Medications   Medication Sig Dispense Refill    hydrOXYzine HCL (ATARAX) 10 mg tablet Take 1 tablet (10 mg total) by mouth 3 (three) times a day as needed for anxiety 90 tablet 0    MELODETTA 24 FE 1-20 MG-MCG(24) CHEW Chew 1 tablet daily Chew and swallow  0    sertraline (ZOLOFT) 25 mg tablet Take 1 tablet (25 mg total) by mouth daily 30 tablet 1     No current facility-administered medications for this visit  Allergies   Allergen Reactions    Shellfish-Derived Products Anaphylaxis       Review of Systems   Constitutional: Negative for chills, diaphoresis and fever  HENT: Negative for congestion, ear pain and sore throat  Eyes: Negative for visual disturbance  Respiratory: Negative for cough, chest tightness and shortness of breath  Cardiovascular: Negative for chest pain, palpitations and leg swelling  Gastrointestinal: Negative for abdominal pain, blood in stool, constipation, diarrhea, nausea and vomiting  Neurological: Negative for dizziness, syncope, weakness, light-headedness and numbness  Psychiatric/Behavioral: Negative for decreased concentration, dysphoric mood, self-injury and suicidal ideas  The patient is nervous/anxious (improving)            I spent 20 minutes with the patient during this visit

## 2020-04-07 DIAGNOSIS — F41.9 ANXIETY DISORDER, UNSPECIFIED TYPE: ICD-10-CM

## 2020-04-09 RX ORDER — SERTRALINE HYDROCHLORIDE 25 MG/1
TABLET, FILM COATED ORAL
Qty: 30 TABLET | Refills: 1 | Status: SHIPPED | OUTPATIENT
Start: 2020-04-09 | End: 2020-04-30

## 2020-04-10 ENCOUNTER — TELEPHONE (OUTPATIENT)
Dept: FAMILY MEDICINE CLINIC | Facility: CLINIC | Age: 35
End: 2020-04-10

## 2020-04-15 ENCOUNTER — TELEPHONE (OUTPATIENT)
Dept: BEHAVIORAL/MENTAL HEALTH CLINIC | Facility: HOME HEALTHCARE | Age: 35
End: 2020-04-15

## 2020-04-16 ENCOUNTER — TELEMEDICINE (OUTPATIENT)
Dept: FAMILY MEDICINE CLINIC | Facility: HOME HEALTHCARE | Age: 35
End: 2020-04-16
Payer: COMMERCIAL

## 2020-04-16 DIAGNOSIS — F41.9 ANXIETY DISORDER, UNSPECIFIED TYPE: ICD-10-CM

## 2020-04-16 DIAGNOSIS — K21.9 GASTROESOPHAGEAL REFLUX DISEASE WITHOUT ESOPHAGITIS: Primary | ICD-10-CM

## 2020-04-16 PROCEDURE — G0071 COMM SVCS BY RHC/FQHC 5 MIN: HCPCS | Performed by: FAMILY MEDICINE

## 2020-04-16 RX ORDER — ASPIRIN 81 MG/1
81 TABLET, CHEWABLE ORAL DAILY
COMMUNITY

## 2020-04-16 RX ORDER — OMEPRAZOLE 40 MG/1
40 CAPSULE, DELAYED RELEASE ORAL DAILY
Qty: 90 CAPSULE | Refills: 0 | Status: SHIPPED | OUTPATIENT
Start: 2020-04-16 | End: 2020-09-10

## 2020-04-17 ENCOUNTER — TELEMEDICINE (OUTPATIENT)
Dept: BEHAVIORAL/MENTAL HEALTH CLINIC | Facility: HOME HEALTHCARE | Age: 35
End: 2020-04-17

## 2020-04-17 DIAGNOSIS — F41.9 ANXIETY DISORDER, UNSPECIFIED TYPE: Primary | ICD-10-CM

## 2020-04-23 ENCOUNTER — TELEMEDICINE (OUTPATIENT)
Dept: BEHAVIORAL/MENTAL HEALTH CLINIC | Facility: HOME HEALTHCARE | Age: 35
End: 2020-04-23

## 2020-04-23 DIAGNOSIS — F41.9 ANXIETY DISORDER, UNSPECIFIED TYPE: Primary | ICD-10-CM

## 2020-04-29 ENCOUNTER — TELEMEDICINE (OUTPATIENT)
Dept: BEHAVIORAL/MENTAL HEALTH CLINIC | Facility: HOME HEALTHCARE | Age: 35
End: 2020-04-29

## 2020-04-29 DIAGNOSIS — F41.9 ANXIETY DISORDER, UNSPECIFIED TYPE: Primary | ICD-10-CM

## 2020-04-30 DIAGNOSIS — F41.9 ANXIETY DISORDER, UNSPECIFIED TYPE: ICD-10-CM

## 2020-04-30 RX ORDER — SERTRALINE HYDROCHLORIDE 25 MG/1
TABLET, FILM COATED ORAL
Qty: 30 TABLET | Refills: 3 | Status: SHIPPED | OUTPATIENT
Start: 2020-04-30 | End: 2020-08-19

## 2020-05-05 ENCOUNTER — TELEMEDICINE (OUTPATIENT)
Dept: BEHAVIORAL/MENTAL HEALTH CLINIC | Facility: HOME HEALTHCARE | Age: 35
End: 2020-05-05

## 2020-05-05 DIAGNOSIS — F41.9 ANXIETY DISORDER, UNSPECIFIED TYPE: Primary | ICD-10-CM

## 2020-05-12 ENCOUNTER — TELEMEDICINE (OUTPATIENT)
Dept: BEHAVIORAL/MENTAL HEALTH CLINIC | Facility: HOME HEALTHCARE | Age: 35
End: 2020-05-12

## 2020-05-12 DIAGNOSIS — F41.9 ANXIETY DISORDER, UNSPECIFIED TYPE: Primary | ICD-10-CM

## 2020-05-21 ENCOUNTER — SOCIAL WORK (OUTPATIENT)
Dept: BEHAVIORAL/MENTAL HEALTH CLINIC | Facility: HOME HEALTHCARE | Age: 35
End: 2020-05-21

## 2020-05-21 DIAGNOSIS — F41.9 ANXIETY: Primary | ICD-10-CM

## 2020-05-26 ENCOUNTER — TELEMEDICINE (OUTPATIENT)
Dept: FAMILY MEDICINE CLINIC | Facility: HOME HEALTHCARE | Age: 35
End: 2020-05-26
Payer: COMMERCIAL

## 2020-05-26 DIAGNOSIS — L25.9 CONTACT DERMATITIS, UNSPECIFIED CONTACT DERMATITIS TYPE, UNSPECIFIED TRIGGER: Primary | ICD-10-CM

## 2020-05-26 DIAGNOSIS — F41.9 ANXIETY DISORDER, UNSPECIFIED TYPE: ICD-10-CM

## 2020-05-26 PROCEDURE — 99213 OFFICE O/P EST LOW 20 MIN: CPT | Performed by: FAMILY MEDICINE

## 2020-05-26 RX ORDER — NORETHINDRONE ACETATE, ETHINYL ESTRADIOL AND FERROUS FUMARATE 1MG-20(24)
KIT ORAL
COMMUNITY
Start: 2020-04-28 | End: 2020-05-26 | Stop reason: ALTCHOICE

## 2020-05-29 ENCOUNTER — TELEMEDICINE (OUTPATIENT)
Dept: BEHAVIORAL/MENTAL HEALTH CLINIC | Facility: HOME HEALTHCARE | Age: 35
End: 2020-05-29

## 2020-05-29 DIAGNOSIS — F41.9 ANXIETY DISORDER, UNSPECIFIED TYPE: Primary | ICD-10-CM

## 2020-06-02 ENCOUNTER — TELEMEDICINE (OUTPATIENT)
Dept: BEHAVIORAL/MENTAL HEALTH CLINIC | Facility: HOME HEALTHCARE | Age: 35
End: 2020-06-02

## 2020-06-02 DIAGNOSIS — F41.9 ANXIETY DISORDER, UNSPECIFIED TYPE: Primary | ICD-10-CM

## 2020-06-10 ENCOUNTER — TELEMEDICINE (OUTPATIENT)
Dept: BEHAVIORAL/MENTAL HEALTH CLINIC | Facility: HOME HEALTHCARE | Age: 35
End: 2020-06-10

## 2020-06-10 DIAGNOSIS — F41.9 ANXIETY DISORDER, UNSPECIFIED TYPE: Primary | ICD-10-CM

## 2020-06-18 ENCOUNTER — TELEMEDICINE (OUTPATIENT)
Dept: BEHAVIORAL/MENTAL HEALTH CLINIC | Facility: HOME HEALTHCARE | Age: 35
End: 2020-06-18

## 2020-06-18 DIAGNOSIS — F41.9 ANXIETY DISORDER, UNSPECIFIED TYPE: Primary | ICD-10-CM

## 2020-06-24 ENCOUNTER — TELEMEDICINE (OUTPATIENT)
Dept: BEHAVIORAL/MENTAL HEALTH CLINIC | Facility: HOME HEALTHCARE | Age: 35
End: 2020-06-24

## 2020-06-24 DIAGNOSIS — F41.9 ANXIETY DISORDER, UNSPECIFIED TYPE: Primary | ICD-10-CM

## 2020-07-01 ENCOUNTER — TELEMEDICINE (OUTPATIENT)
Dept: BEHAVIORAL/MENTAL HEALTH CLINIC | Facility: HOME HEALTHCARE | Age: 35
End: 2020-07-01

## 2020-07-01 ENCOUNTER — OFFICE VISIT (OUTPATIENT)
Dept: FAMILY MEDICINE CLINIC | Facility: HOME HEALTHCARE | Age: 35
End: 2020-07-01
Payer: COMMERCIAL

## 2020-07-01 VITALS
TEMPERATURE: 98.7 F | OXYGEN SATURATION: 98 % | DIASTOLIC BLOOD PRESSURE: 70 MMHG | WEIGHT: 230.8 LBS | RESPIRATION RATE: 18 BRPM | HEIGHT: 66 IN | HEART RATE: 76 BPM | SYSTOLIC BLOOD PRESSURE: 108 MMHG | BODY MASS INDEX: 37.09 KG/M2

## 2020-07-01 DIAGNOSIS — E66.09 CLASS 2 OBESITY DUE TO EXCESS CALORIES WITHOUT SERIOUS COMORBIDITY WITH BODY MASS INDEX (BMI) OF 35.0 TO 35.9 IN ADULT: ICD-10-CM

## 2020-07-01 DIAGNOSIS — Z13.228 SCREENING FOR ENDOCRINE, NUTRITIONAL, METABOLIC AND IMMUNITY DISORDER: ICD-10-CM

## 2020-07-01 DIAGNOSIS — Z23 NEED FOR TDAP VACCINATION: Primary | ICD-10-CM

## 2020-07-01 DIAGNOSIS — Z13.21 SCREENING FOR ENDOCRINE, NUTRITIONAL, METABOLIC AND IMMUNITY DISORDER: ICD-10-CM

## 2020-07-01 DIAGNOSIS — Z86.39 HISTORY OF HASHIMOTO THYROIDITIS: ICD-10-CM

## 2020-07-01 DIAGNOSIS — Z13.29 SCREENING FOR ENDOCRINE, NUTRITIONAL, METABOLIC AND IMMUNITY DISORDER: ICD-10-CM

## 2020-07-01 DIAGNOSIS — R63.5 WEIGHT GAIN: ICD-10-CM

## 2020-07-01 DIAGNOSIS — R00.2 PALPITATIONS: ICD-10-CM

## 2020-07-01 DIAGNOSIS — Z13.0 SCREENING FOR ENDOCRINE, NUTRITIONAL, METABOLIC AND IMMUNITY DISORDER: ICD-10-CM

## 2020-07-01 DIAGNOSIS — F41.9 ANXIETY DISORDER, UNSPECIFIED TYPE: Primary | ICD-10-CM

## 2020-07-01 PROCEDURE — 99213 OFFICE O/P EST LOW 20 MIN: CPT | Performed by: FAMILY MEDICINE

## 2020-07-01 RX ORDER — NORETHINDRONE ACETATE, ETHINYL ESTRADIOL AND FERROUS FUMARATE 1MG-20(24)
KIT ORAL
COMMUNITY
Start: 2020-05-31 | End: 2020-09-16 | Stop reason: ALTCHOICE

## 2020-07-01 NOTE — LETTER
July 1, 6163     Patient: Sharath Mcgowan   YOB: 1985   Date of Visit: 7/1/2020       To Whom it May Concern:    Sharath Mcgowan is under my professional care  She was seen in my office on 7/1/2020  She may return to work on 07/13/2020  Patient may return to work with limited work scope  If you have any questions or concerns, please don't hesitate to call           Sincerely,          SHABNAM Sánchez        CC: No Recipients

## 2020-07-01 NOTE — PROGRESS NOTES
[address]       NAME: Adam Rivers is a 37 y o  female  : 1985    MRN: [id number]  DATE: 2020  TIME: 1:41 PM    Assessment and Plan   Diagnoses and all orders for this visit:    Need for Tdap vaccination  -     Tdap vaccine greater than or equal to 8yo IM    Class 2 obesity due to excess calories without serious comorbidity with body mass index (BMI) of 35 0 to 35 9 in adult    Palpitations    History of Hashimoto thyroiditis  -     US thyroid; Future    Weight gain  -     US thyroid; Future    Screening for endocrine, nutritional, metabolic and immunity disorder  -     CBC and differential; Future  -     Comprehensive metabolic panel; Future  -     HEMOGLOBIN A1C W/ EAG ESTIMATION; Future  -     Lipid Panel with Direct LDL reflex; Future  -     Vitamin D 25 hydroxy; Future  -     TSH, 3rd generation with Free T4 reflex; Future  -     Iron Panel (Includes Ferritin, Iron Sat%, Iron, and TIBC); Future    Other orders  -     MIBELAS 24 FE 1-20 MG-MCG(24) CHEW        No problem-specific Assessment & Plan notes found for this encounter  Patient Instructions           Chief Complaint     Chief Complaint   Patient presents with    Follow-up     no acute complaints per pt         History of Present Illness       Arian Garvin is here for follow up/ return to work  I previously filled out [place] paperwork for debilitating anxiety which had been exacerbated by neurologic/ cardiac symptoms and the recent covid pandemic  She is tolerating Zoloft and Atarax with improvement of anxiety, and is established with our LCSW for weekly counseling sessions also  She reports some improvement, and would like to return to work  She is known to Cardiology and recently followed up with them for PFO  She is also known to neurology after TIA symptoms a few months ago  She reports another episode of TIA like symptoms a few weeks ago with left sides pins and needles sensation and fatigue   She reports that cardiology and neurology are collaborating to determine is PFO closure is necessary  She is to begin a two week cardiac monitor study tomorrow  She continues on 81 mg of aspirin  She has a history of Hashimoto's, recent TSH in March was normal   She feels that she is gaining weight despite increased activity level and decreased calorie intake  Is due for routine labs, will repeat TSH and order thyroid ultrasound as well  She declines nutrition referral at this time  Review of Systems   Review of Systems   Constitutional: Positive for unexpected weight change  Gaining weight, "I'm more active than ever and eating less"  HENT: Negative  Respiratory: Negative  Cardiovascular: Positive for palpitations  Negative for chest pain and leg swelling  Gastrointestinal: Negative  Genitourinary: Negative  Musculoskeletal: Negative  Neurological: Positive for dizziness and numbness  Had another episode a few weeks numbness (pins and needles) to left side, no facial drooping or slurred speech, no chest pain  Did feel "tired and spacey" as well as "weak" after episode            Current Medications       Current Outpatient Medications:     aspirin 81 mg chewable tablet, Chew 81 mg daily, Disp: , Rfl:     hydrOXYzine HCL (ATARAX) 10 mg tablet, Take 1 tablet (10 mg total) by mouth 3 (three) times a day as needed for anxiety, Disp: 90 tablet, Rfl: 0    omeprazole (PriLOSEC) 40 MG capsule, Take 1 capsule (40 mg total) by mouth daily, Disp: 90 capsule, Rfl: 0    sertraline (ZOLOFT) 25 mg tablet, take 1 tablet by mouth once daily, Disp: 30 tablet, Rfl: 3    MIBELAS 24 FE 1-20 MG-MCG(24) CHEW, , Disp: , Rfl:     Current Allergies     Allergies as of 07/01/2020 - Reviewed 07/01/2020   Allergen Reaction Noted    Shellfish-derived products Anaphylaxis 04/12/2019            The following portions of the patient's history were reviewed and updated as appropriate: allergies, current medications, past family history, past medical history, past social history, past surgical history and problem list      Past Medical History:   Diagnosis Date    Disease of thyroid gland     hashimotos    TIA (transient ischemic attack)        Past Surgical History:   Procedure Laterality Date    OTHER SURGICAL HISTORY      Lymphatic surgery       Family History   Problem Relation Age of Onset    Arthritis Mother     Thyroid cancer Mother     Arthritis Father     Thyroid cancer Maternal Aunt          Medications have been verified  Objective   /70 (BP Location: Right arm, Patient Position: Sitting, Cuff Size: Large)   Pulse 76   Temp 98 7 °F (37 1 °C) (Temporal)   Resp 18   Ht 5' 6" (1 676 m)   Wt 105 kg (230 lb 12 8 oz)   SpO2 98%   BMI 37 25 kg/m²        Physical Exam     Physical Exam   Constitutional: She is oriented to person, place, and time  She appears well-developed and well-nourished  HENT:   Mouth/Throat: Oropharynx is clear and moist    Eyes: Pupils are equal, round, and reactive to light  Conjunctivae and EOM are normal    Neck: Normal range of motion  Neck supple  No thyromegaly present  Cardiovascular: Normal rate, regular rhythm, normal heart sounds and intact distal pulses  Pulmonary/Chest: Effort normal and breath sounds normal    Abdominal: Soft  Bowel sounds are normal    Musculoskeletal: Normal range of motion  Lymphadenopathy:     She has no cervical adenopathy  Neurological: She is alert and oriented to person, place, and time  Skin: Skin is warm and dry  Capillary refill takes less than 2 seconds  Psychiatric: She has a normal mood and affect  Her behavior is normal  Judgment and thought content normal    Nursing note and vitals reviewed

## 2020-07-01 NOTE — PSYCH
Individual Psychotherapy: 2:42-3:18 pm  LCSW attempted to call at 2:36, however, client did not   Goals addressed in session: Goal 1    Interventions used in session: LCSW used a client center and CBT technique approach  D: LCSW called and spoke to client for virtual session  It was my intent to perform this visit via video technology, but the patient was not able to do a video connection, so the visit was completed via audio telephone only  Client gave permission to have session over the phone  LCSW and client processed her current feelings  Client reported she was able to talk to her sister about her nephew and wanting some ashes  Client reported she was glad her sister allowed this request  LCSW and client processed more of client's grief  Around her nephew  Client was engaged in session  A: client was oriented to time, place, and person  Client did not present with self harm or homicidal thoughts  P: client will meet next week for session  Pain:  Pain was not discussed    Current suicide risk : Effie 1153: Diagnosis and Treatment Plan explained to Radha Haynes relates understanding diagnosis and is agreeable to Treatment Plan  Yes

## 2020-07-08 ENCOUNTER — TELEMEDICINE (OUTPATIENT)
Dept: BEHAVIORAL/MENTAL HEALTH CLINIC | Facility: HOME HEALTHCARE | Age: 35
End: 2020-07-08

## 2020-07-08 DIAGNOSIS — F41.9 ANXIETY DISORDER, UNSPECIFIED TYPE: Primary | ICD-10-CM

## 2020-07-08 NOTE — PSYCH
Individual Psychotherapy: 2:05-2:34 pm    Goals addressed in session: goal 1    Interventions used in session: LCSW used a client center and CBT technique approach  D: LCSW called and spoke to client for virtual session  It was my intent to perform this visit via video technology, but the patient was not able to do a video connection, so the visit was completed via audio telephone only  Client gave permission to have session over the phone  LCSW and client processed how client was feeling  " I am okay" client reported  Client reported some anxiety symptoms as her sister was not doing well and she was worried she contributed to this  LCSW and client processed her feelings about this and LCSW used CBT techniques to help client control her anxiety symptoms  Client was engaged  A: client was oriented to time, place, and person  Client did not present with self harm or homicidal thoughts  P: client will have virtual session next week  Pain: no pain discussed    Current suicide risk : Low    Behavioral Health Treatment Plan  [facility]: Diagnosis and Treatment Plan explained to Jessika Landy  relates understanding diagnosis and is agreeable to Treatment Plan  Yes

## 2020-07-09 ENCOUNTER — TELEPHONE (OUTPATIENT)
Dept: OTHER | Facility: OTHER | Age: 35
End: 2020-07-09

## 2020-07-09 NOTE — TELEPHONE ENCOUNTER
Karen calling from [place] [phone]  "I just need to know if Cassy Chambers gave this pt a RTW date from [facility] 7/1   Please give me a call back "

## 2020-07-10 NOTE — TELEPHONE ENCOUNTER
Per work note from the 7/1 office visit, pt was given a return to work with restrictions date of 07/13/2020  I provided this information to the woman with steph  She said she had no further questions for us  Her name is Karen NAGY  And her direct line is [phone] if we would have any questions for her in the future

## 2020-07-15 NOTE — PSYCH
Individual Psychotherapy: 2:57-3:23 pm    Goals addressed in session: goal 1    Interventions used in session: LCSW used a client center approach  D: LCSW called and spoke to client for virtual session  It was my intent to perform this visit via video technology, but the patient was not able to do a video connection, so the visit was completed via audio telephone only  Client gave permission to have session over the phone  LCSW and client processed client's current feelings  " I am doing okay" client reported  Client reported a decrease in her anxiety and wants to see how next week goes when she goes back to work  LCSW and client processed what she would do to keep her anxiety down and practiced client setting boundaries to help keep her anxiety down  A: client was oriented to time, place, and person  Client did not present with self harm or homicidal thoughts  P: client will meet next week for virtual session  Pain: no pain discussed    Current suicide risk : Low    Behavioral Health Treatment Plan [facility]: Diagnosis and Treatment Plan explained to Paco Sherron  relates understanding diagnosis and is agreeable to Treatment Plan  Yes

## 2020-07-16 ENCOUNTER — TELEMEDICINE (OUTPATIENT)
Dept: BEHAVIORAL/MENTAL HEALTH CLINIC | Facility: HOME HEALTHCARE | Age: 35
End: 2020-07-16

## 2020-07-16 DIAGNOSIS — F41.9 ANXIETY DISORDER, UNSPECIFIED TYPE: Primary | ICD-10-CM

## 2020-07-20 ENCOUNTER — HOSPITAL ENCOUNTER (OUTPATIENT)
Dept: ULTRASOUND IMAGING | Facility: HOSPITAL | Age: 35
Discharge: HOME | End: 2020-07-20
Payer: COMMERCIAL

## 2020-07-20 ENCOUNTER — APPOINTMENT (OUTPATIENT)
Dept: LAB | Facility: HOSPITAL | Age: 35
End: 2020-07-20
Payer: COMMERCIAL

## 2020-07-20 DIAGNOSIS — Z13.228 SCREENING FOR ENDOCRINE, NUTRITIONAL, METABOLIC AND IMMUNITY DISORDER: ICD-10-CM

## 2020-07-20 DIAGNOSIS — Z86.39 HISTORY OF HASHIMOTO THYROIDITIS: ICD-10-CM

## 2020-07-20 DIAGNOSIS — Z13.21 SCREENING FOR ENDOCRINE, NUTRITIONAL, METABOLIC AND IMMUNITY DISORDER: ICD-10-CM

## 2020-07-20 DIAGNOSIS — Z13.0 SCREENING FOR ENDOCRINE, NUTRITIONAL, METABOLIC AND IMMUNITY DISORDER: ICD-10-CM

## 2020-07-20 DIAGNOSIS — R63.5 WEIGHT GAIN: ICD-10-CM

## 2020-07-20 DIAGNOSIS — Z13.29 SCREENING FOR ENDOCRINE, NUTRITIONAL, METABOLIC AND IMMUNITY DISORDER: ICD-10-CM

## 2020-07-20 LAB
25(OH)D3 SERPL-MCNC: 13 NG/ML (ref 30–100)
ALBUMIN SERPL BCP-MCNC: 3.9 G/DL (ref 3.5–5.7)
ALP SERPL-CCNC: 58 U/L (ref 40–150)
ALT SERPL W P-5'-P-CCNC: 23 U/L (ref 7–52)
ANION GAP SERPL CALCULATED.3IONS-SCNC: 7 MMOL/L (ref 4–13)
AST SERPL W P-5'-P-CCNC: 17 U/L (ref 13–39)
BASOPHILS # BLD AUTO: 0 THOUSANDS/ΜL (ref 0–0.1)
BASOPHILS NFR BLD AUTO: 1 % (ref 0–2)
BILIRUB SERPL-MCNC: 0.5 MG/DL (ref 0.2–1)
BUN SERPL-MCNC: 10 MG/DL (ref 7–25)
CALCIUM SERPL-MCNC: 9.1 MG/DL (ref 8.6–10.5)
CHLORIDE SERPL-SCNC: 104 MMOL/L (ref 98–107)
CHOLEST SERPL-MCNC: 188 MG/DL (ref 0–200)
CO2 SERPL-SCNC: 26 MMOL/L (ref 21–31)
CREAT SERPL-MCNC: 0.78 MG/DL (ref 0.6–1.2)
EOSINOPHIL # BLD AUTO: 0 THOUSAND/ΜL (ref 0–0.61)
EOSINOPHIL NFR BLD AUTO: 1 % (ref 0–5)
ERYTHROCYTE [DISTWIDTH] IN BLOOD BY AUTOMATED COUNT: 12.5 % (ref 11.5–14.5)
EST. AVERAGE GLUCOSE BLD GHB EST-MCNC: 97 MG/DL
FERRITIN SERPL-MCNC: 116 NG/ML (ref 8–388)
GFR SERPL CREATININE-BSD FRML MDRD: 100 ML/MIN/1.73SQ M
GLUCOSE P FAST SERPL-MCNC: 86 MG/DL (ref 65–99)
HBA1C MFR BLD: 5 %
HCT VFR BLD AUTO: 45.2 % (ref 42–47)
HDLC SERPL-MCNC: 37 MG/DL
HGB BLD-MCNC: 15.3 G/DL (ref 12–16)
IRON SATN MFR SERPL: 29 %
IRON SERPL-MCNC: 97 UG/DL (ref 50–170)
LDLC SERPL CALC-MCNC: 134 MG/DL (ref 0–100)
LYMPHOCYTES # BLD AUTO: 2.1 THOUSANDS/ΜL (ref 0.6–4.47)
LYMPHOCYTES NFR BLD AUTO: 31 % (ref 21–51)
MCH RBC QN AUTO: 30.7 PG (ref 26–34)
MCHC RBC AUTO-ENTMCNC: 33.7 G/DL (ref 31–37)
MCV RBC AUTO: 91 FL (ref 81–99)
MONOCYTES # BLD AUTO: 0.4 THOUSAND/ΜL (ref 0.17–1.22)
MONOCYTES NFR BLD AUTO: 6 % (ref 2–12)
NEUTROPHILS # BLD AUTO: 4.3 THOUSANDS/ΜL (ref 1.4–6.5)
NEUTS SEG NFR BLD AUTO: 62 % (ref 42–75)
PLATELET # BLD AUTO: 273 THOUSANDS/UL (ref 149–390)
PMV BLD AUTO: 7.8 FL (ref 8.6–11.7)
POTASSIUM SERPL-SCNC: 4 MMOL/L (ref 3.5–5.5)
PROT SERPL-MCNC: 6.8 G/DL (ref 6.4–8.9)
RBC # BLD AUTO: 4.98 MILLION/UL (ref 3.9–5.2)
SODIUM SERPL-SCNC: 137 MMOL/L (ref 134–143)
TIBC SERPL-MCNC: 335 UG/DL (ref 250–450)
TRIGL SERPL-MCNC: 83 MG/DL (ref 44–166)
TSH SERPL DL<=0.05 MIU/L-ACNC: 0.85 UIU/ML (ref 0.45–5.33)
WBC # BLD AUTO: 6.9 THOUSAND/UL (ref 4.8–10.8)

## 2020-07-20 PROCEDURE — 83540 ASSAY OF IRON: CPT

## 2020-07-20 PROCEDURE — 83036 HEMOGLOBIN GLYCOSYLATED A1C: CPT

## 2020-07-20 PROCEDURE — 85025 COMPLETE CBC W/AUTO DIFF WBC: CPT

## 2020-07-20 PROCEDURE — 84443 ASSAY THYROID STIM HORMONE: CPT

## 2020-07-20 PROCEDURE — 82728 ASSAY OF FERRITIN: CPT

## 2020-07-20 PROCEDURE — 86800 THYROGLOBULIN ANTIBODY: CPT | Performed by: FAMILY MEDICINE

## 2020-07-20 PROCEDURE — 86376 MICROSOMAL ANTIBODY EACH: CPT | Performed by: FAMILY MEDICINE

## 2020-07-20 PROCEDURE — 80061 LIPID PANEL: CPT

## 2020-07-20 PROCEDURE — 80053 COMPREHEN METABOLIC PANEL: CPT

## 2020-07-20 PROCEDURE — 76536 US EXAM OF HEAD AND NECK: CPT

## 2020-07-20 PROCEDURE — 83550 IRON BINDING TEST: CPT

## 2020-07-20 PROCEDURE — 36415 COLL VENOUS BLD VENIPUNCTURE: CPT

## 2020-07-20 PROCEDURE — 82306 VITAMIN D 25 HYDROXY: CPT

## 2020-07-21 DIAGNOSIS — E55.9 VITAMIN D DEFICIENCY: Primary | ICD-10-CM

## 2020-07-21 LAB
THYROGLOB AB SERPL-ACNC: <1 IU/ML (ref 0–0.9)
THYROPEROXIDASE AB SERPL-ACNC: 18 IU/ML (ref 0–34)

## 2020-07-21 RX ORDER — ERGOCALCIFEROL 1.25 MG/1
50000 CAPSULE ORAL WEEKLY
Qty: 4 CAPSULE | Refills: 2 | Status: SHIPPED | OUTPATIENT
Start: 2020-07-21 | End: 2020-10-05

## 2020-07-22 ENCOUNTER — TELEMEDICINE (OUTPATIENT)
Dept: BEHAVIORAL/MENTAL HEALTH CLINIC | Facility: HOME HEALTHCARE | Age: 35
End: 2020-07-22

## 2020-07-22 DIAGNOSIS — F41.9 ANXIETY DISORDER, UNSPECIFIED TYPE: Primary | ICD-10-CM

## 2020-07-24 NOTE — PSYCH
Individual Psychotherapy: 2:30-3:09 pm    Goals addressed in session: goal 1    Interventions used in session: LCSW used a client center approach and CBT techniques  D: LCSW called and spoke to client for virtual session  It was my intent to perform this visit via video technology, but the patient was not able to do a video connection, so the visit was completed via audio telephone only  Client gave permission to have session over the phone  LCSW and client processed client's current feelings  " I had an interesting week" client reported  Client reported some anxiety and other feelings  LCSW and client processed this and discussed how her anxiety has increased this week  LCSW and client discussed how to use her skills to help decrease her anxiety  A: client was oriented to time, place, and person  Client did not present with self harm or homicidal thoughts  P:  Client will meet next week for session  Pain: no pain discussed    Current suicide risk : Low    Behavioral Health Treatment Plan [facility]: Diagnosis and Treatment Plan explained to Bebe Bird  relates understanding diagnosis and is agreeable to Treatment Plan  Yes

## 2020-07-28 NOTE — PSYCH
Individual Psychotherapy: 2:30-3:14 pm    Goals addressed in session: goal 1    Interventions used in session: LCSW used a client center approach  D: LCSW called and spoke to client for virtual session  It was my intent to perform this visit via video technology, but the patient was not able to do a video connection, so the visit was completed via audio telephone only  Client gave permission to have session over the phone  LCSW and client processed client's current feelings  " I have had a strange week" client reported  Client reported she was "okay" as well  LCSW and client processed how client has been tearful over little things this week and she is not sure why  LCSW and client processed this and discussed how she has had a lot on her plate and to allow herself to feel things rather than avoid them  Client reports she has avoided her feelings in the past and discussed how it hard to deal with them  LCSW validated client's feelings and processed how her coping skills help her allow her to sit with them  Client reported she would try her skills this week  A: client was oriented to time, place, and person  Client did not present with self harm or homicidal thoughts  P: client will  Meet next week for session  Pain: no pain reported    Current suicide risk : Effie 1153: Diagnosis and Treatment Plan explained to Rupali Ortiz relates understanding diagnosis and is agreeable to Treatment Plan  Yes

## 2020-08-06 ENCOUNTER — TELEMEDICINE (OUTPATIENT)
Dept: BEHAVIORAL/MENTAL HEALTH CLINIC | Facility: HOME HEALTHCARE | Age: 35
End: 2020-08-06

## 2020-08-06 DIAGNOSIS — F41.9 ANXIETY DISORDER, UNSPECIFIED TYPE: Primary | ICD-10-CM

## 2020-08-12 NOTE — PSYCH
Individual Psychotherapy: 3:30-4:15 pm    Goals addressed in session: goal 1    Interventions used in session: LCSW used a client center approach  D: LCSW called and spoke to client for virtual session  It was my intent to perform this visit via video technology, but the patient was not able to do a video connection, so the visit was completed via audio telephone only  Client gave permission to have session over the phone  LCSW and client processed client's current feelings  " I am okay" client reported  LCSW and client processed how she was struggling this week  LCSW and client discussed her life stressors this week and client reported stress with work and grief  LCSW and client processed these and discussed her feelings behind these things  LCSW discussed stages of grief and discussed where she at  A: client was oriented to time, place, and person  Client did not present with self harm or homicidal thoughts  Client had normal speech  P:  Client will meet next week  Pain: no pain discussed  Current suicide risk : Low    Behavioral Health Treatment Plan  [facility]: Diagnosis and Treatment Plan explained to Devin London  relates understanding diagnosis and is agreeable to Treatment Plan  Yes

## 2020-08-13 ENCOUNTER — TELEMEDICINE (OUTPATIENT)
Dept: BEHAVIORAL/MENTAL HEALTH CLINIC | Facility: HOME HEALTHCARE | Age: 35
End: 2020-08-13

## 2020-08-13 DIAGNOSIS — F41.9 ANXIETY DISORDER, UNSPECIFIED TYPE: ICD-10-CM

## 2020-08-19 ENCOUNTER — OFFICE VISIT (OUTPATIENT)
Dept: FAMILY MEDICINE CLINIC | Facility: HOME HEALTHCARE | Age: 35
End: 2020-08-19
Payer: COMMERCIAL

## 2020-08-19 VITALS
HEART RATE: 80 BPM | SYSTOLIC BLOOD PRESSURE: 116 MMHG | HEIGHT: 66 IN | OXYGEN SATURATION: 99 % | BODY MASS INDEX: 37.28 KG/M2 | DIASTOLIC BLOOD PRESSURE: 72 MMHG | TEMPERATURE: 97.8 F | WEIGHT: 232 LBS | RESPIRATION RATE: 16 BRPM

## 2020-08-19 DIAGNOSIS — R00.2 PALPITATIONS: ICD-10-CM

## 2020-08-19 DIAGNOSIS — Z23 IMMUNIZATION DUE: Primary | ICD-10-CM

## 2020-08-19 DIAGNOSIS — E66.09 CLASS 2 OBESITY DUE TO EXCESS CALORIES WITHOUT SERIOUS COMORBIDITY WITH BODY MASS INDEX (BMI) OF 35.0 TO 35.9 IN ADULT: ICD-10-CM

## 2020-08-19 DIAGNOSIS — F41.9 ANXIETY DISORDER, UNSPECIFIED TYPE: ICD-10-CM

## 2020-08-19 PROCEDURE — 99213 OFFICE O/P EST LOW 20 MIN: CPT | Performed by: FAMILY MEDICINE

## 2020-08-19 NOTE — PATIENT INSTRUCTIONS
Regular exercise and physical activity may help you control your weight  Diet and exercise play an important role in controlling your weight  Exercise strengthens your heart and improves your circulation  This lowers risks of heart disease  Exercise can lower your blood sugar level and help your insulin work better  This will cut down your risk of type 2 diabetes  Exercise can improve your mood and make you feel more relaxed  This can reduce your risk of depression  Reassurance given  Daily physical activity recommended  Improve sleep quality  Consider hobbies  Avoid alcohol, tobacco, caffeine, substance abuse  Set goals  Utilize coping mechanisms  Reviewed relaxation techniques such as yoga and meditation  May consider mental health services for cognitive therapy

## 2020-08-19 NOTE — PROGRESS NOTES
[address]       NAME: Christina Artis is a 37 y o  female  : 1985    MRN: [id number]  DATE: 2020  TIME: 11:13 AM    Assessment and Plan   Diagnoses and all orders for this visit:    Immunization due  -     Cancel: Tdap vaccine greater than or equal to 8yo IM    Class 2 obesity due to excess calories without serious comorbidity with body mass index (BMI) of 35 0 to 35 9 in adult    Palpitations    Anxiety disorder, unspecified type        No problem-specific Assessment & Plan notes found for this encounter  Patient Instructions           Chief Complaint     Chief Complaint   Patient presents with    Follow-up    Anxiety    Hashimoto's Thyroiditis         History of Present Illness       Abebe Huggins is here for follow up  She has known Cardiology and follows with them for PFO, review of note reveals zio patch revealed no arrhythmias  They are awaiting recommendation of neurologist, who she is scheduled to follow up with in October  She has not had another episode of TIA like symptoms in the last couple of weeks  She has had increased anxiety, but reports that she feels she is managing it well with p r n  Atarax, and therefore has discontinued her SSR I  She agrees to call the office if she feels that her depression/anxiety year worsening  She denies any suicidal ideation  No other complaints or concerns today  Review of Systems   Review of Systems   Constitutional: Negative  HENT: Negative  Respiratory: Negative  Cardiovascular: Negative  Gastrointestinal: Negative  Genitourinary: Negative  Musculoskeletal: Negative  Neurological: Negative  Psychiatric/Behavioral: Negative            Current Medications       Current Outpatient Medications:     aspirin 81 mg chewable tablet, Chew 81 mg daily, Disp: , Rfl:     ergocalciferol (VITAMIN D2) 50,000 units, Take 1 capsule (50,000 Units total) by mouth once a week, Disp: 4 capsule, Rfl: 2   hydrOXYzine HCL (ATARAX) 10 mg tablet, Take 1 tablet (10 mg total) by mouth 3 (three) times a day as needed for anxiety, Disp: 90 tablet, Rfl: 0    omeprazole (PriLOSEC) 40 MG capsule, Take 1 capsule (40 mg total) by mouth daily, Disp: 90 capsule, Rfl: 0    MIBELAS 24 FE 1-20 MG-MCG(24) CHEW, , Disp: , Rfl:     Current Allergies     Allergies as of 08/19/2020 - Reviewed 08/19/2020   Allergen Reaction Noted    Shellfish-derived products Anaphylaxis 04/12/2019            The following portions of the patient's history were reviewed and updated as appropriate: allergies, current medications, past family history, past medical history, past social history, past surgical history and problem list      Past Medical History:   Diagnosis Date    Disease of thyroid gland     hashimotos    TIA (transient ischemic attack)        Past Surgical History:   Procedure Laterality Date    OTHER SURGICAL HISTORY      Lymphatic surgery       Family History   Problem Relation Age of Onset    Arthritis Mother     Thyroid cancer Mother     Arthritis Father     Thyroid cancer Maternal Aunt          Medications have been verified  Objective   /72   Pulse 80   Temp 97 8 °F (36 6 °C)   Resp 16   Ht 5' 6" (1 676 m)   Wt 105 kg (232 lb)   SpO2 99%   BMI 37 45 kg/m²        Physical Exam     Physical Exam  Vitals signs and nursing note reviewed  Constitutional:       Appearance: Normal appearance  HENT:      Mouth/Throat:      Mouth: Mucous membranes are moist       Pharynx: Oropharynx is clear  Eyes:      Extraocular Movements: Extraocular movements intact  Conjunctiva/sclera: Conjunctivae normal       Pupils: Pupils are equal, round, and reactive to light  Neck:      Musculoskeletal: Normal range of motion  Cardiovascular:      Rate and Rhythm: Normal rate and regular rhythm  Pulmonary:      Effort: Pulmonary effort is normal       Breath sounds: Normal breath sounds     Abdominal:      General: Bowel sounds are normal       Palpations: Abdomen is soft  Musculoskeletal: Normal range of motion  Skin:     General: Skin is warm and dry  Capillary Refill: Capillary refill takes less than 2 seconds  Neurological:      Mental Status: She is alert and oriented to person, place, and time  Psychiatric:         Mood and Affect: Mood normal          Behavior: Behavior normal          Thought Content:  Thought content normal          Judgment: Judgment normal

## 2020-08-19 NOTE — PSYCH
Individual Psychotherapy: 3;30-4:05 pm    Goals addressed in session: goal 1    Interventions used in session: LCSW used a client center approach and reflective listening in session  D: LCSW called and spoke to client for virtual session  It was my intent to perform this visit via video technology, but the patient was not able to do a video connection, so the visit was completed via audio telephone only  Client gave permission to have session over the phone  LCSW and client processed client's current feelings  " I am okay" client reported  LCSW and client processed client's feeling of stressed out over her job  LCSW and client discussed increasing her coping skills and self care to help decrease the burn out  Client reported she would  A: client was oriented to time, place, and person  Client did not present with self harm or homicidal thoughts  P:  Client will meet next week for session  Pain: no pain discussed    Current suicide risk : Effie 1153: Diagnosis and Treatment Plan explained to [facility]  relates understanding diagnosis and is agreeable to Treatment Plan  Yes

## 2020-08-20 ENCOUNTER — TELEMEDICINE (OUTPATIENT)
Dept: BEHAVIORAL/MENTAL HEALTH CLINIC | Facility: HOME HEALTHCARE | Age: 35
End: 2020-08-20

## 2020-08-20 DIAGNOSIS — F41.9 ANXIETY DISORDER, UNSPECIFIED TYPE: Primary | ICD-10-CM

## 2020-08-27 NOTE — PSYCH
Individual Psychotherapy: 4:06-4:30 pm    Goals addressed in session: goal 1    Interventions used in session: LCSW used a client center approach  LCSW used role modeling in session  D: LCSW called and spoke to client for virtual session  It was my intent to perform this visit via video technology, but the patient was not able to do a video connection, so the visit was completed via audio telephone only  Client gave permission to have session via phone  LCSW and client processed client's current feelings  " I am doing okay" client reported  Client reported she was feeling like her old self today and received acknowledgement from her boss she was doing well since being back at work  LCSW and client processed these feelings and discussed how to keep allowing herself to work at the pace she is going  LCSW role modeled for client on how she can keep using her skills  A: client was oriented to time, place, and person  Client did not present with self harm or homicidal thoughts  Client had a pleasant tone and reports a decrease in her anxiety  P: client will meet next week for session  Pain: client did not report on pain today    Current suicide risk : Low    Behavioral Health Treatment Plan [facility]: Diagnosis and Treatment Plan explained to [facility] [place]  relates understanding diagnosis and is agreeable to Treatment Plan  Yes

## 2020-09-03 ENCOUNTER — TELEMEDICINE (OUTPATIENT)
Dept: BEHAVIORAL/MENTAL HEALTH CLINIC | Facility: HOME HEALTHCARE | Age: 35
End: 2020-09-03

## 2020-09-03 DIAGNOSIS — F41.9 ANXIETY: Primary | ICD-10-CM

## 2020-09-10 ENCOUNTER — TELEPHONE (OUTPATIENT)
Dept: BEHAVIORAL/MENTAL HEALTH CLINIC | Facility: HOME HEALTHCARE | Age: 35
End: 2020-09-10

## 2020-09-10 DIAGNOSIS — K21.9 GASTROESOPHAGEAL REFLUX DISEASE WITHOUT ESOPHAGITIS: ICD-10-CM

## 2020-09-10 RX ORDER — OMEPRAZOLE 40 MG/1
CAPSULE, DELAYED RELEASE ORAL
Qty: 90 CAPSULE | Refills: 0 | Status: SHIPPED | OUTPATIENT
Start: 2020-09-10 | End: 2021-06-09 | Stop reason: SDUPTHER

## 2020-09-14 NOTE — PSYCH
Individual Psychotherapy: 3:53-4;21 pm    Goals addressed in session: goal 1    Interventions used in session: LCSW used a client center approach  LCSW used psychoeducation  D: LCSW called and spoke to client for virtual session  It was my intent to perform this visit via video technology, but the patient was not able to do a video connection, so the visit was completed via audio telephone only  Client gave permission to have session via phone  LCSW and client processed client's current feelings  " I am doing okay" client reported  LCSW and client processed what was going well  Client reported she was stressed out and having panic attacks  LCSW and client processed why she thought she was having these  Client struggled to identify a clear trigger  LCSW and client processed possible triggers but client struggled to see any of these as the cause for the recent  LCSW and client discussed engaging in EMDR therapy  LCSW used psychoeducation to help client understand this model  Client is interested in engaging in model  LCSW will start the process  A: client was oriented to time, place, and person  Client did not present with self harm or homicidal thoughts  P: client will meet next week  Pain: no pain discussed    Current suicide risk : Low    Behavioral Health Treatment Plan [facility]: Diagnosis and Treatment Plan explained to Arron Reyes  relates understanding diagnosis and is agreeable to Treatment Plan  Yes

## 2020-09-16 ENCOUNTER — OFFICE VISIT (OUTPATIENT)
Dept: FAMILY MEDICINE CLINIC | Facility: HOME HEALTHCARE | Age: 35
End: 2020-09-16
Payer: COMMERCIAL

## 2020-09-16 VITALS
HEART RATE: 80 BPM | RESPIRATION RATE: 18 BRPM | HEIGHT: 66 IN | WEIGHT: 229.4 LBS | OXYGEN SATURATION: 99 % | DIASTOLIC BLOOD PRESSURE: 70 MMHG | BODY MASS INDEX: 36.87 KG/M2 | SYSTOLIC BLOOD PRESSURE: 122 MMHG | TEMPERATURE: 97.3 F

## 2020-09-16 DIAGNOSIS — Z88.9 HISTORY OF ALLERGIC REACTION: Primary | ICD-10-CM

## 2020-09-16 PROCEDURE — 99213 OFFICE O/P EST LOW 20 MIN: CPT | Performed by: FAMILY MEDICINE

## 2020-09-16 RX ORDER — EPINEPHRINE 0.3 MG/.3ML
0.3 INJECTION SUBCUTANEOUS ONCE
Qty: 0.6 ML | Refills: 0 | Status: SHIPPED | OUTPATIENT
Start: 2020-09-16 | End: 2021-01-06

## 2020-09-16 RX ORDER — PREDNISONE 50 MG/1
TABLET ORAL
COMMUNITY
Start: 2020-09-11 | End: 2020-10-20 | Stop reason: ALTCHOICE

## 2020-09-16 NOTE — PROGRESS NOTES
[address]       NAME: Bishnu Fine is a 37 y o  female  : 1985    MRN: [id number]  DATE: 2020  TIME: 10:26 AM    Assessment and Plan   Diagnoses and all orders for this visit:    History of allergic reaction  -     Ambulatory referral to Allergy; Future  -     EPINEPHrine (EPIPEN) 0 3 mg/0 3 mL SOAJ; Inject 0 3 mL (0 3 mg total) into a muscle once for 1 dose  -     [facility] Allergy Panel, Adult; Future    Other orders  -     predniSONE 50 mg tablet; take 1 tablet by mouth once daily STARTING ON 2020        No problem-specific Assessment & Plan notes found for this encounter  Patient Instructions           Chief Complaint     Chief Complaint   Patient presents with    Allergic Reaction     [id number] has been getting off and on hives  Wants allergy testing  History of Present Illness       Wayne Mckeon is here for complaint of allergic reactions recently  She was seen in urgent care recently and treated with steroids  Symptoms have resolved  She denies any angioedema or shortness of breath but did feel that she had tongue/throat swelling at her most recent incident  Thoroughly reviewed emergent symptoms requiring immediate follow-up  She is agreeable to an EpiPen and is aware that she will still need to follow-up in the emergency room if she needs to use it  No known exposures to new plants/medications/personal products, no obvious reason for allergic reaction  Symptoms typically present does hives  Requesting allergist referral an allergy testing            Review of Systems   Review of Systems   Constitutional: Negative  Negative for chills, fatigue and fever  HENT: Negative  Negative for sinus pain  Respiratory: Negative  Negative for cough and shortness of breath  Cardiovascular: Negative  Gastrointestinal: Negative  Negative for blood in stool, constipation, diarrhea, nausea and vomiting  Genitourinary: Negative      Musculoskeletal: Negative  Skin: Positive for rash  Rash a few days   Neurological: Negative  Psychiatric/Behavioral: Negative  Negative for suicidal ideas  Current Medications       Current Outpatient Medications:     aspirin 81 mg chewable tablet, Chew 81 mg daily, Disp: , Rfl:     ergocalciferol (VITAMIN D2) 50,000 units, Take 1 capsule (50,000 Units total) by mouth once a week, Disp: 4 capsule, Rfl: 2    hydrOXYzine HCL (ATARAX) 10 mg tablet, Take 1 tablet (10 mg total) by mouth 3 (three) times a day as needed for anxiety, Disp: 90 tablet, Rfl: 0    omeprazole (PriLOSEC) 40 MG capsule, take 1 capsule by mouth once daily, Disp: 90 capsule, Rfl: 0    predniSONE 50 mg tablet, take 1 tablet by mouth once daily STARTING ON 9-, Disp: , Rfl:     EPINEPHrine (EPIPEN) 0 3 mg/0 3 mL SOAJ, Inject 0 3 mL (0 3 mg total) into a muscle once for 1 dose, Disp: 0 6 mL, Rfl: 0    Current Allergies     Allergies as of 09/16/2020 - Reviewed 09/16/2020   Allergen Reaction Noted    Shellfish-derived products Anaphylaxis 04/12/2019            The following portions of the patient's history were reviewed and updated as appropriate: allergies, current medications, past family history, past medical history, past social history, past surgical history and problem list      Past Medical History:   Diagnosis Date    Anxiety disorder 6/5/2015    Disease of thyroid gland     hashimotos    TIA (transient ischemic attack)        Past Surgical History:   Procedure Laterality Date    OTHER SURGICAL HISTORY      Lymphatic surgery       Family History   Problem Relation Age of Onset    Arthritis Mother     Thyroid cancer Mother     Arthritis Father     Thyroid cancer Maternal Aunt          Medications have been verified          Objective   /70 (BP Location: Right arm, Patient Position: Sitting, Cuff Size: Large)   Pulse 80   Temp (!) 97 3 °F (36 3 °C) (Temporal)   Resp 18   Ht 5' 6" (1 676 m)   Wt 104 kg (229 lb 6 4 oz)   SpO2 99%   BMI 37 03 kg/m²        Physical Exam     Physical Exam  Vitals signs and nursing note reviewed  Constitutional:       Appearance: Normal appearance  HENT:      Mouth/Throat:      Mouth: Mucous membranes are moist       Pharynx: Oropharynx is clear  Eyes:      Conjunctiva/sclera: Conjunctivae normal       Pupils: Pupils are equal, round, and reactive to light  Neck:      Musculoskeletal: Normal range of motion and neck supple  Cardiovascular:      Rate and Rhythm: Normal rate and regular rhythm  Pulses: Normal pulses  Heart sounds: Normal heart sounds  Pulmonary:      Effort: Pulmonary effort is normal       Breath sounds: Normal breath sounds  Abdominal:      General: Bowel sounds are normal       Palpations: Abdomen is soft  Musculoskeletal: Normal range of motion  Skin:     General: Skin is warm and dry  Capillary Refill: Capillary refill takes less than 2 seconds  Neurological:      Mental Status: She is alert and oriented to person, place, and time  Psychiatric:         Mood and Affect: Mood normal          Behavior: Behavior normal          Thought Content:  Thought content normal          Judgment: Judgment normal

## 2020-09-16 NOTE — PATIENT INSTRUCTIONS
Keep a diary of symptoms  If you must use an EpiPen you will need to follow-up in the emergency room via EMS  Schedule appointment to set of care with an allergist  Thoroughly reviewed emergent symptoms requiring immediate follow-up  Benadryl as per package

## 2020-09-17 ENCOUNTER — TELEMEDICINE (OUTPATIENT)
Dept: BEHAVIORAL/MENTAL HEALTH CLINIC | Facility: HOME HEALTHCARE | Age: 35
End: 2020-09-17

## 2020-09-17 DIAGNOSIS — F41.9 ANXIETY: ICD-10-CM

## 2020-09-24 NOTE — PSYCH
Individual Psychotherapy: 3:06-3:49 pm    Goals addressed in session: goal 1    Interventions used in session: LCSW used a client center approach and psychoeducation  D: LCSW called and spoke to client for virtual session  It was my intent to perform this visit via video technology, but the patient was not able to do a video connection, so the visit was completed via audio telephone only  Client gave permission to have session via phone  LCSW and client processed client's current feelings  " I am okay" client reported  Client reported work was going okay and she was still struggling with anxiety symptoms  Client reported feeling anxious at times and doesn't understand why  Client also discussed guilt and struggling with putting boundaries in place  LCSW validated client's feelings and discussed how she can put boundaries in place and how this may help decrease her anxiety  LCSW used psychoeducation to help connect and educate client on this  LCSW also used psychoedcuation to help client understand her diagnosis and how it connects to her daily life  Client was engaged in session  A: client was oriented to time, place, and person  Client did not present with self harm or homicidal thoughts  P: client will meet next week for session  Pain: no pain discussed    Current suicide risk : Low    Behavioral Health Treatment Plan [facility]: Diagnosis and Treatment Plan explained to Silvio Fatima relates understanding diagnosis and is agreeable to Treatment Plan  Yes

## 2020-09-29 ENCOUNTER — TELEPHONE (OUTPATIENT)
Dept: BEHAVIORAL/MENTAL HEALTH CLINIC | Facility: HOME HEALTHCARE | Age: 35
End: 2020-09-29

## 2020-10-01 ENCOUNTER — TELEMEDICINE (OUTPATIENT)
Dept: BEHAVIORAL/MENTAL HEALTH CLINIC | Facility: HOME HEALTHCARE | Age: 35
End: 2020-10-01

## 2020-10-01 DIAGNOSIS — F41.9 ANXIETY: ICD-10-CM

## 2020-10-03 DIAGNOSIS — E55.9 VITAMIN D DEFICIENCY: ICD-10-CM

## 2020-10-05 ENCOUNTER — DOCUMENTATION (OUTPATIENT)
Dept: BEHAVIORAL/MENTAL HEALTH CLINIC | Facility: HOME HEALTHCARE | Age: 35
End: 2020-10-05

## 2020-10-05 RX ORDER — ERGOCALCIFEROL 1.25 MG/1
CAPSULE ORAL
Qty: 4 CAPSULE | Refills: 2 | Status: SHIPPED | OUTPATIENT
Start: 2020-10-05 | End: 2021-06-09 | Stop reason: ALTCHOICE

## 2020-10-08 ENCOUNTER — TELEMEDICINE (OUTPATIENT)
Dept: BEHAVIORAL/MENTAL HEALTH CLINIC | Facility: HOME HEALTHCARE | Age: 35
End: 2020-10-08

## 2020-10-08 DIAGNOSIS — F41.9 ANXIETY: ICD-10-CM

## 2020-10-14 ENCOUNTER — TELEPHONE (OUTPATIENT)
Dept: FAMILY MEDICINE CLINIC | Facility: CLINIC | Age: 35
End: 2020-10-14

## 2020-10-15 ENCOUNTER — TELEMEDICINE (OUTPATIENT)
Dept: BEHAVIORAL/MENTAL HEALTH CLINIC | Facility: HOME HEALTHCARE | Age: 35
End: 2020-10-15

## 2020-10-15 DIAGNOSIS — F41.9 ANXIETY: ICD-10-CM

## 2020-10-20 ENCOUNTER — OFFICE VISIT (OUTPATIENT)
Dept: FAMILY MEDICINE CLINIC | Facility: HOME HEALTHCARE | Age: 35
End: 2020-10-20
Payer: COMMERCIAL

## 2020-10-20 VITALS
RESPIRATION RATE: 18 BRPM | BODY MASS INDEX: 37.16 KG/M2 | TEMPERATURE: 97.5 F | HEART RATE: 80 BPM | WEIGHT: 231.2 LBS | HEIGHT: 66 IN | SYSTOLIC BLOOD PRESSURE: 110 MMHG | OXYGEN SATURATION: 98 % | DIASTOLIC BLOOD PRESSURE: 70 MMHG

## 2020-10-20 DIAGNOSIS — G43.109 COMPLICATED MIGRAINE: Primary | ICD-10-CM

## 2020-10-20 DIAGNOSIS — E66.09 CLASS 2 OBESITY DUE TO EXCESS CALORIES WITHOUT SERIOUS COMORBIDITY WITH BODY MASS INDEX (BMI) OF 35.0 TO 35.9 IN ADULT: ICD-10-CM

## 2020-10-20 PROCEDURE — 99213 OFFICE O/P EST LOW 20 MIN: CPT | Performed by: FAMILY MEDICINE

## 2020-10-27 ENCOUNTER — TELEPHONE (OUTPATIENT)
Dept: BEHAVIORAL/MENTAL HEALTH CLINIC | Facility: CLINIC | Age: 35
End: 2020-10-27

## 2020-10-29 ENCOUNTER — TELEMEDICINE (OUTPATIENT)
Dept: BEHAVIORAL/MENTAL HEALTH CLINIC | Facility: CLINIC | Age: 35
End: 2020-10-29

## 2020-10-29 DIAGNOSIS — F41.9 ANXIETY: ICD-10-CM

## 2020-11-12 ENCOUNTER — TELEMEDICINE (OUTPATIENT)
Dept: BEHAVIORAL/MENTAL HEALTH CLINIC | Facility: HOME HEALTHCARE | Age: 35
End: 2020-11-12

## 2020-11-12 DIAGNOSIS — F41.9 ANXIETY: ICD-10-CM

## 2020-12-17 ENCOUNTER — DOCUMENTATION (OUTPATIENT)
Dept: BEHAVIORAL/MENTAL HEALTH CLINIC | Facility: HOME HEALTHCARE | Age: 35
End: 2020-12-17

## 2020-12-31 DIAGNOSIS — E55.9 VITAMIN D DEFICIENCY: ICD-10-CM

## 2020-12-31 RX ORDER — ERGOCALCIFEROL 1.25 MG/1
CAPSULE ORAL
Qty: 4 CAPSULE | Refills: 2 | OUTPATIENT
Start: 2020-12-31

## 2021-01-21 ENCOUNTER — TELEMEDICINE (OUTPATIENT)
Dept: FAMILY MEDICINE CLINIC | Facility: CLINIC | Age: 36
End: 2021-01-21
Payer: COMMERCIAL

## 2021-01-21 ENCOUNTER — TELEMEDICINE (OUTPATIENT)
Dept: BEHAVIORAL/MENTAL HEALTH CLINIC | Facility: CLINIC | Age: 36
End: 2021-01-21
Payer: COMMERCIAL

## 2021-01-21 DIAGNOSIS — B34.9 VIRAL INFECTION, UNSPECIFIED: ICD-10-CM

## 2021-01-21 DIAGNOSIS — J01.00 ACUTE NON-RECURRENT MAXILLARY SINUSITIS: ICD-10-CM

## 2021-01-21 DIAGNOSIS — B34.9 VIRAL INFECTION, UNSPECIFIED: Primary | ICD-10-CM

## 2021-01-21 DIAGNOSIS — F41.9 ANXIETY DISORDER, UNSPECIFIED TYPE: Primary | ICD-10-CM

## 2021-01-21 DIAGNOSIS — F41.9 ANXIETY: ICD-10-CM

## 2021-01-21 PROCEDURE — 87637 SARSCOV2&INF A&B&RSV AMP PRB: CPT | Performed by: PHYSICIAN ASSISTANT

## 2021-01-21 PROCEDURE — 99213 OFFICE O/P EST LOW 20 MIN: CPT | Performed by: FAMILY MEDICINE

## 2021-01-21 RX ORDER — AMOXICILLIN AND CLAVULANATE POTASSIUM 875; 125 MG/1; MG/1
1 TABLET, FILM COATED ORAL EVERY 12 HOURS SCHEDULED
Qty: 14 TABLET | Refills: 0 | Status: SHIPPED | OUTPATIENT
Start: 2021-01-21 | End: 2021-01-28

## 2021-01-21 NOTE — PROGRESS NOTES
COVID-19 Virtual Visit     Assessment/Plan:    Problem List Items Addressed This Visit     None      Visit Diagnoses     Viral infection, unspecified    -  Primary    Relevant Orders    Novel Coronavirus 2019(COVID-19), Influenza A/B, RSV [facility] Collected at [facility] or [facility]    Acute non-recurrent maxillary sinusitis        Relevant Medications    amoxicillin-clavulanate (AUGMENTIN) 875-125 mg per tablet         Disposition:     I recommended self-quarantine for 14 days and to call back for worsening symptoms or development of shortness of breath  I referred patient to one of our centralized sites for a COVID-19 swab  COVID test ordered  Will start Augmentin for possible sinusitis  May continue mucinex, tylneol/ibuprofen prn  Will follow up with COVID results  Advised patient to call the office sooner with any worsening symptoms  I have spent 10 minutes directly with the patient  Encounter provider Ana Maria Betancur PA-C    Provider located at [address] [place] [address] Drive    Recent Visits  No visits were found meeting these conditions  Showing recent visits within past 7 days and meeting all other requirements     Today's Visits  Date Type Provider Dept   01/21/21 Telemedicine JB Haile   Showing today's visits and meeting all other requirements     Future Appointments  No visits were found meeting these conditions  Showing future appointments within next 150 days and meeting all other requirements      This virtual check-in was done via [employer] and patient was informed that this is not a secure, HIPAA-compliant platform  She agrees to proceed  Patient agrees to participate in a virtual check in via telephone or video visit instead of presenting to the office to address urgent/immediate medical needs  Patient is aware this is a billable service      After connecting through [facility], the patient was identified by name and date of birth  Donavon Rubinstein was informed that this was a telemedicine visit and that the exam was being conducted confidentially over secure lines  My office door was closed  No one else was in the room  Donavon Rubinstein acknowledged consent and understanding of privacy and security of the telemedicine visit  I informed the patient that I have reviewed her record in Epic and presented the opportunity for her to ask any questions regarding the visit today  The patient agreed to participate  Subjective:   Donavon Rubinstein is a 37 y o  female who is concerned about COVID-19  Patient's symptoms include fatigue, nasal congestion, rhinorrhea, sore throat, cough and nausea  Patient denies fever, chills, anosmia, loss of taste, shortness of breath, chest tightness, abdominal pain, vomiting, diarrhea, myalgias and headaches  Date of symptom onset: 1/15/2021    Exposure:   Contact with a person who is under investigation (PUI) for or who is positive for COVID-19 within the last 14 days?: No    Hospitalized recently for fever and/or lower respiratory symptoms?: No      Currently a healthcare worker that is involved in direct patient care?: No      Works in a special setting where the risk of COVID-19 transmission may be high? (this may include long-term care, correctional and FPC facilities; homeless shelters; assisted-living facilities and group homes ): No      Resident in a special setting where the risk of COVID-19 transmission may be high? (this may include long-term care, correctional and FPC facilities; homeless shelters; assisted-living facilities and group homes ): No      Patient reports the above symptoms which started 1/15  She is not aware of any COVID contacts  She has been taking mucinex without improvement      No results found for: Sheri Pride, [address] [facility], [place] [address]  Past Medical History:   Diagnosis Date    Abnormal heart rhythm     Anxiety disorder 6/5/2015    Disease of thyroid gland     hashimotos    Headache     Heartburn     History of chickenpox     Hives     TIA (transient ischemic attack)      Past Surgical History:   Procedure Laterality Date    OTHER SURGICAL HISTORY      Lymphatic surgery    WISDOM TOOTH EXTRACTION       Current Outpatient Medications   Medication Sig Dispense Refill    aspirin 81 mg chewable tablet Chew 81 mg daily      ergocalciferol (VITAMIN D2) 50,000 units take 1 capsule by mouth every week 4 capsule 2    famotidine (PEPCID) 20 mg tablet Take 1 tablet (20 mg total) by mouth 2 (two) times a day 60 tablet 11    fexofenadine (ALLEGRA) 180 MG tablet Take 1 tablet (180 mg total) by mouth daily IN AM 90 tablet 3    hydrOXYzine HCL (ATARAX) 10 mg tablet Take 1 tablet (10 mg total) by mouth 3 (three) times a day as needed for anxiety 90 tablet 0    levocetirizine (XYZAL) 5 MG tablet Take 1 tablet (5 mg total) by mouth every evening 90 tablet 3    omeprazole (PriLOSEC) 40 MG capsule take 1 capsule by mouth once daily 90 capsule 0    amoxicillin-clavulanate (AUGMENTIN) 875-125 mg per tablet Take 1 tablet by mouth every 12 (twelve) hours for 7 days 14 tablet 0    aspirin 81 mg chewable tablet Chew 81 mg daily       No current facility-administered medications for this visit  Allergies   Allergen Reactions    Shellfish-Derived Products Anaphylaxis     Patient states she had an allergy screen and it came back negative  Review of Systems   Constitutional: Positive for fatigue  Negative for chills, diaphoresis and fever  HENT: Positive for congestion, ear pain, rhinorrhea, sinus pressure, sneezing and sore throat  Respiratory: Positive for cough and wheezing  Negative for chest tightness and shortness of breath  Gastrointestinal: Positive for nausea  Negative for abdominal pain, diarrhea and vomiting  Musculoskeletal: Negative for myalgias     Neurological: Negative for dizziness, syncope, weakness, light-headedness and headaches  Objective: There were no vitals filed for this visit  Physical Exam  Constitutional:       General: She is not in acute distress  Appearance: Normal appearance  HENT:      Head: Normocephalic and atraumatic  Pulmonary:      Effort: Pulmonary effort is normal  No respiratory distress  Neurological:      General: No focal deficit present  Mental Status: She is alert and oriented to person, place, and time  Psychiatric:         Mood and Affect: Mood normal          Behavior: Behavior normal        VIRTUAL VISIT DISCLAIMER    Monyximena Miguel Angel acknowledges that she has consented to an online visit or consultation  She understands that the online visit is based solely on information provided by her, and that, in the absence of a face-to-face physical evaluation by the physician, the diagnosis she receives is both limited and provisional in terms of accuracy and completeness  This is not intended to replace a full medical face-to-face evaluation by the physician  Levon Michelle understands and accepts these terms

## 2021-01-22 LAB
FLUAV RNA NPH QL NAA+PROBE: NOT DETECTED
FLUBV RNA NPH QL NAA+PROBE: NOT DETECTED
RSV RNA NPH QL NAA+PROBE: NOT DETECTED
SARS-COV-2 RNA NPH QL NAA+PROBE: DETECTED

## 2021-01-25 ENCOUNTER — TELEPHONE (OUTPATIENT)
Dept: FAMILY MEDICINE CLINIC | Facility: HOME HEALTHCARE | Age: 36
End: 2021-01-25

## 2021-01-25 NOTE — TELEPHONE ENCOUNTER
----- Message from David Pratt PA-C sent at 1/25/2021  2:51 PM EST -----  Yes she can still complete the antibiotic as prescribed   ----- Message -----  From: Oleg Bateman MA  Sent: 1/25/2021   2:31 PM EST  To: Manju Laguerre,     Patient called office asking if she should still take the augmentin you prescribed for possible sinusitis? Her COVID test came back positive, that's why she is unsure  Please advise

## 2021-01-27 ENCOUNTER — TELEMEDICINE (OUTPATIENT)
Dept: FAMILY MEDICINE CLINIC | Facility: HOME HEALTHCARE | Age: 36
End: 2021-01-27
Payer: COMMERCIAL

## 2021-01-27 VITALS — TEMPERATURE: 97.6 F

## 2021-01-27 DIAGNOSIS — U07.1 COVID-19: Primary | ICD-10-CM

## 2021-01-27 PROCEDURE — 99213 OFFICE O/P EST LOW 20 MIN: CPT | Performed by: FAMILY MEDICINE

## 2021-01-27 NOTE — PROGRESS NOTES
Virtual Brief Visit    It was my intent to perform this visit via video technology but the patient was not able to do a video connections of the visit was completed via audio telephone only  Assessment/Plan:    Problem List Items Addressed This Visit     None      Visit Diagnoses     COVID-19    -  Primary                Reason for visit is   Chief Complaint   Patient presents with    Virtual Brief Visit     vitamin c, d3, zinc, melatonin since she tested positive for COVID    Virtual Brief Visit        Encounter provider SHABNAM Goel    Provider located at [address] [place] [address]  [phone]    Recent Visits  Date Type Provider Dept   01/25/21 Telephone Jeanie George MA [address] recent visits within past 7 days and meeting all other requirements     Today's Visits  Date Type Provider Dept   01/27/21 61 SHABNAM Pedraza [address] today's visits and meeting all other requirements     Future Appointments  No visits were found meeting these conditions  Showing future appointments within next 150 days and meeting all other requirements        After connecting through telephone, the patient was identified by name and date of birth  Rose Duarte was informed that this is a telemedicine visit and that the visit is being conducted through telephone  My office door was closed  No one else was in the room  She acknowledged consent and understanding of privacy and security of the platform  The patient has agreed to participate and understands she can discontinue the visit at any time  Patient is aware this is a billable service  Subjective    Rose Duarte is a 37 y o  female for covid f/u  Piper Mistry presents via telemed for covid f/u  S/s started 1 days ago, positive test one week ago  Is starting to feel better   Occasional PARSON and cough, other symptoms have resolved  Declines cxr  Will f/u if s/s worsen  Supportive and emergent s/s reviewed  Past Medical History:   Diagnosis Date    Abnormal heart rhythm     Anxiety disorder 6/5/2015    Disease of thyroid gland     hashimotos    Headache     Heartburn     History of chickenpox     Hives     TIA (transient ischemic attack)        Past Surgical History:   Procedure Laterality Date    OTHER SURGICAL HISTORY      Lymphatic surgery    WISDOM TOOTH EXTRACTION         Current Outpatient Medications   Medication Sig Dispense Refill    amoxicillin-clavulanate (AUGMENTIN) 875-125 mg per tablet Take 1 tablet by mouth every 12 (twelve) hours for 7 days 14 tablet 0    aspirin 81 mg chewable tablet Chew 81 mg daily      ergocalciferol (VITAMIN D2) 50,000 units take 1 capsule by mouth every week 4 capsule 2    famotidine (PEPCID) 20 mg tablet Take 1 tablet (20 mg total) by mouth 2 (two) times a day 60 tablet 11    fexofenadine (ALLEGRA) 180 MG tablet Take 1 tablet (180 mg total) by mouth daily IN AM 90 tablet 3    hydrOXYzine HCL (ATARAX) 10 mg tablet Take 1 tablet (10 mg total) by mouth 3 (three) times a day as needed for anxiety 90 tablet 0    levocetirizine (XYZAL) 5 MG tablet Take 1 tablet (5 mg total) by mouth every evening 90 tablet 3    omeprazole (PriLOSEC) 40 MG capsule take 1 capsule by mouth once daily 90 capsule 0     No current facility-administered medications for this visit  Allergies   Allergen Reactions    Shellfish-Derived Products Anaphylaxis     Patient states she had an allergy screen and it came back negative  Review of Systems   Constitutional: Negative  Negative for chills, fatigue and fever  HENT: Negative  Negative for sinus pain  Respiratory: Positive for cough  Negative for shortness of breath  Gray   Cardiovascular: Negative  Gastrointestinal: Negative  Negative for blood in stool, constipation, diarrhea, nausea and vomiting     Genitourinary: Negative  Musculoskeletal: Negative  Skin: Negative  Negative for rash  Neurological: Negative  Psychiatric/Behavioral: Negative  Negative for suicidal ideas  Vitals:    01/27/21 0840   Temp: 97 6 °F (36 4 °C)         I spent 15 minutes with patient today in which greater than 50% of the time was spent in counseling/coordination of care regarding assessment and plan of care    VIRTUAL VISIT DISCLAIMER    Cierra Lee acknowledges that she has consented to an online visit or consultation  She understands that the online visit is based solely on information provided by her, and that, in the absence of a face-to-face physical evaluation by the physician, the diagnosis she receives is both limited and provisional in terms of accuracy and completeness  This is not intended to replace a full medical face-to-face evaluation by the physician  Cierra Lee understands and accepts these terms

## 2021-01-28 NOTE — PSYCH
Individual Psychotherapy: 4:11-4:45 pm    Goals addressed in session: goal 1    Interventions used in session: LCSW used a client center approach  D: LCSW called and spoke to client for virtual session  It was my intent to perform this visit via video technology, but the patient was not able to do a video connection, so the visit was completed via audio telephone only  LCSW discussed she was in a room by herself, with the door closed, and this was a billable service  Client gave permission to have session via phone  LCSW and client processed client's current feelings  " I am doing okay" client reported  Client reported she was sick and had an appointment with PCP to do a COVID test  Client reported she has not been feeling well and has had 2 anxiety attacks this past 2 weeks  Client and LCSW discussed the triggers to these attacks and discussed how she feels triggered at work  Client reported she feels it was due because she had her anxiety attack there  LCSW and client discussed processing this more to help her work through  A: client was oriented to time, place, and person  Client did not present with self harm or homicidal thoughts  Client appeared to have a cold  Normal speech and client was engaged in session  P: client will meet in 2 weeks for session  ,     Pain: no pain discussed, client reported feeling sick  Current suicide risk : Low    Behavioral Health Treatment Plan [facility]: Diagnosis and Treatment Plan explained to [facility]  relates understanding diagnosis and is agreeable to Treatment Plan  Yes

## 2021-02-11 ENCOUNTER — TELEMEDICINE (OUTPATIENT)
Dept: BEHAVIORAL/MENTAL HEALTH CLINIC | Facility: CLINIC | Age: 36
End: 2021-02-11

## 2021-02-11 DIAGNOSIS — F41.9 ANXIETY: Primary | ICD-10-CM

## 2021-02-22 NOTE — PSYCH
Individual Psychotherapy: 3:56-4:50 pm     Goals addressed in session: goal 1    Interventions used in session: LCSW used a client center approach  LCSW used narrative therapy techniques to help client work on goal      D: LCSW called and spoke to client for virtual session  It was my intent to perform this visit via video technology, but the patient was not able to do a video connection, so the visit was completed via audio telephone only  LCSW discussed she was in a room by herself, with the door closed, and this was a billable service  Client gave permission to have session via phone  LCSW and client processed client's current feelings  " I am okay" client reported  Client reported she had a very stressful weekend and using narrative therapy techniques, LCSW had client process the event  LCSW and client discussed how she felt triggered and was feeling anxious around this  LCSW used role modeling in session on how she could address her feelings to her peer whom she felt triggered her  LCSW also role modeled alternative ways client could handle the situation again in the future  Client reported she felt better after the session  A: client was oriented to time, place, and person  Client did not present with self harm or homicidal thoughts  Client appeared anxious on the phone and had all over thoughts  P: client will meet in 2 weeks for ession    Pain: no pain reported  Current suicide risk : [address]: Diagnosis and Treatment Plan explained to Rich Velasco  relates understanding diagnosis and is agreeable to Treatment Plan  Yes

## 2021-02-24 ENCOUNTER — TELEMEDICINE (OUTPATIENT)
Dept: BEHAVIORAL/MENTAL HEALTH CLINIC | Facility: HOME HEALTHCARE | Age: 36
End: 2021-02-24

## 2021-02-24 DIAGNOSIS — F41.9 ANXIETY: Primary | ICD-10-CM

## 2021-03-03 NOTE — PSYCH
Individual Psychotherapy: 4:03-4:46 pm     Goals addressed in session: goal 1    Interventions used in session: LCSW used a client center approach  LCSW used CBT and EMDR techniques to help work on goals  D: LCSW called and spoke to client for virtual session  It was my intent to perform this visit via video technology, but the patient was not able to do a video connection, so the visit was completed via audio telephone only  LCSW discussed she was in a room by herself, with the door closed, and this was a billable service  Client gave permission to have session via phone  LCSW and client processed client's current feelings  " I am okay" client reported  Client reported about the same level of anxiety over the last 2 weeks  LCSW and client processed how client was struggling with work and felt a change was needed  Client reported she had a job interview and was not able to accept it but was happy she followed through with the interview  LCSW used CBT techniques to have client address her anxiety  Client was able to identify her feelings and what skills she could use  LCSW also used EMDR technique to help client also decrease her anxiety  Client reported she would try the EMDR technique this week  A: client was oriented to time, place, and person  Client did not present with self harm or homicidal thoughts  Client was engaged in session, had normal speech, and her anxiety appears to stay the same  P: client will meet in 2 weeks for session     Pain: no pain reported    Current suicide risk : [address]: Diagnosis and Treatment Plan explained to Ken Watson  relates understanding diagnosis and is agreeable to Treatment Plan  Yes

## 2021-03-11 ENCOUNTER — TELEMEDICINE (OUTPATIENT)
Dept: BEHAVIORAL/MENTAL HEALTH CLINIC | Facility: CLINIC | Age: 36
End: 2021-03-11

## 2021-03-11 DIAGNOSIS — F41.9 ANXIETY: Primary | ICD-10-CM

## 2021-03-18 NOTE — PSYCH
Individual Psychotherapy: 4:02-4:40 pm    Goals addressed in session: goal 1    Interventions used in session: LCSW used a client center approach  LCSW used CBT techniques to help work on goal      D: LCSW called and spoke to client for virtual session  It was my intent to perform this visit via video technology, but the patient was not able to do a video connection, so the visit was completed via audio telephone only  LCSW discussed she was in a room by herself, with the door closed, and this was a billable service  Client gave permission to have session via phone  LCSW and client processed client's current feelings  " I am okay" client reported  Client reported feeling stressed out due to work  LCSW and client processed her triggers and client was able to express her feelings  LCSW used CBT techniques to help client understand how to decrease her symptom  A: client was oriented to time, place, and person  Client did not present with self harm or homicidal thoughts  Client had normal thoughts, normal speech, and was engaged in session  P: client will meet in 2 weeds for session    Pain: no pain reported    Current suicide risk : [address]: Diagnosis and Treatment Plan explained to Aguila Hodges relates understanding diagnosis and is agreeable to Treatment Plan  Yes

## 2021-03-25 ENCOUNTER — TELEMEDICINE (OUTPATIENT)
Dept: BEHAVIORAL/MENTAL HEALTH CLINIC | Facility: CLINIC | Age: 36
End: 2021-03-25

## 2021-03-25 DIAGNOSIS — F41.9 ANXIETY: Primary | ICD-10-CM

## 2021-04-07 ENCOUNTER — DOCUMENTATION (OUTPATIENT)
Dept: BEHAVIORAL/MENTAL HEALTH CLINIC | Facility: HOME HEALTHCARE | Age: 36
End: 2021-04-07

## 2021-04-12 NOTE — PSYCH
Individual Psychotherapy: 3:57-4:39 pm    Goals addressed in session: goal 1    Interventions used in session: LCSW used a client center approach  LCSW used PHQ9 assessment  D: LCSW called and spoke to client for virtual session  It was my intent to perform this visit via video technology, but the patient was not able to do a video connection, so the visit was completed via audio telephone only  LCSW discussed she was in a room by herself, with the door closed, and this was a billable service  Client gave permission to have session via phone  LCSW and client processed client's current feelings  Client reported she feels a lack of energy and irritable the past few weeks  LCSW and client discussed this and LCSW gave client the [facility] assessment  Client scored a 16 and LCSW discussed this with client  Client was in agreement to talk to [facility] clinic for medication management or her PCP  LCSW provided client with [facility]'s number  LCSW also discussed alternative coping skills and implementing self care  Client was able to discuss how she was going to increase her self care  A: client was oriented to time, place, and person  Client did not present with self harm or homicidal thoughts  Client had normal speech and was engaged in session  P: client will meet in 2 weeks for session    Pain: no pain reported    Current suicide risk : [address]: Diagnosis and Treatment Plan explained to Blondell Goldberg  relates understanding diagnosis and is agreeable to Treatment Plan  Yes

## 2021-04-14 ENCOUNTER — RA CDI HCC (OUTPATIENT)
Dept: OTHER | Facility: HOSPITAL | Age: 36
End: 2021-04-14

## 2021-04-14 NOTE — PROGRESS NOTES
[facility]  coding opportunities          Chart reviewed, no opportunity found: CHART REVIEWED, NO OPPORTUNITY FOUND              Patients insurance company: [employer] (Medicare Advantage and Commercial)

## 2021-04-20 ENCOUNTER — OFFICE VISIT (OUTPATIENT)
Dept: FAMILY MEDICINE CLINIC | Facility: HOME HEALTHCARE | Age: 36
End: 2021-04-20
Payer: COMMERCIAL

## 2021-04-20 VITALS
HEART RATE: 88 BPM | SYSTOLIC BLOOD PRESSURE: 128 MMHG | WEIGHT: 234.8 LBS | HEIGHT: 66 IN | RESPIRATION RATE: 18 BRPM | OXYGEN SATURATION: 96 % | BODY MASS INDEX: 37.73 KG/M2 | TEMPERATURE: 97.9 F | DIASTOLIC BLOOD PRESSURE: 70 MMHG

## 2021-04-20 DIAGNOSIS — Z13.0 SCREENING FOR ENDOCRINE, NUTRITIONAL, METABOLIC AND IMMUNITY DISORDER: ICD-10-CM

## 2021-04-20 DIAGNOSIS — Z13.29 SCREENING FOR ENDOCRINE, NUTRITIONAL, METABOLIC AND IMMUNITY DISORDER: ICD-10-CM

## 2021-04-20 DIAGNOSIS — G43.109 COMPLICATED MIGRAINE: ICD-10-CM

## 2021-04-20 DIAGNOSIS — E55.9 VITAMIN D DEFICIENCY: ICD-10-CM

## 2021-04-20 DIAGNOSIS — Z13.21 SCREENING FOR ENDOCRINE, NUTRITIONAL, METABOLIC AND IMMUNITY DISORDER: ICD-10-CM

## 2021-04-20 DIAGNOSIS — Q21.1 PFO (PATENT FORAMEN OVALE): ICD-10-CM

## 2021-04-20 DIAGNOSIS — I73.00 RAYNAUD'S PHENOMENON WITHOUT GANGRENE: Primary | ICD-10-CM

## 2021-04-20 DIAGNOSIS — F41.9 ANXIETY: ICD-10-CM

## 2021-04-20 DIAGNOSIS — Z13.228 SCREENING FOR ENDOCRINE, NUTRITIONAL, METABOLIC AND IMMUNITY DISORDER: ICD-10-CM

## 2021-04-20 PROCEDURE — 99214 OFFICE O/P EST MOD 30 MIN: CPT | Performed by: FAMILY MEDICINE

## 2021-04-20 RX ORDER — LORATADINE AND PSEUDOEPHEDRINE SULFATE 10; 240 MG/1; MG/1
1 TABLET, EXTENDED RELEASE ORAL DAILY
COMMUNITY
End: 2021-12-09 | Stop reason: ALTCHOICE

## 2021-04-20 NOTE — PROGRESS NOTES
[address]       NAME: Herbert Bauer is a 31 y o  female  : 1985    MRN: [id number]  DATE: 2021  TIME: 1:06 PM    Assessment and Plan   Diagnoses and all orders for this visit:    Raynaud's phenomenon without gangrene  -     BIANKA Screen w/ Reflex to Titer/Pattern; Future  -     RF Screen w/ Reflex to Titer; Future    Screening for endocrine, nutritional, metabolic and immunity disorder  -     CBC and differential; Future  -     Comprehensive metabolic panel; Future  -     TSH, 3rd generation with Free T4 reflex; Future  -     Lipid panel; Future  -     Vitamin D 25 hydroxy; Future    Vitamin D deficiency  -     Vitamin D 25 hydroxy; Future    Anxiety    Complicated migraine    PFO (patent foramen ovale)    Other orders  -     loratadine-pseudoephedrine (Claritin-D 24 Hour)  mg per 24 hr tablet; Take 1 tablet by mouth daily         No problem-specific Assessment & Plan notes found for this encounter  plan   Patient will return in 2 weeks to review lab work, have annual physical, and discuss further workup for Raynaud's  BIANKA/RF ordered  Form completed for patient to return to work without restrictions  Patient states has specific blood work pending that was ordered by Neurology at [facility] who she follows for complicated migraine  Patient instructed to call me questions or concerns  Chief Complaint     Chief Complaint   Patient presents with    Annual Exam     routine check and has forms for work          History of Present Illness       HPI    30-year-old female presents today for routine follow-up visit  Patient with history of TIA versus complicated migraine  Patient following with Neurology [facility] for complicated migraine  Patient here today for annual physical and clearance to return to work  Patient however states has been working full-time quite some time however never completed paperwork    Patient states has no limitations  Patient complaining of recently having issues with the tips of her fingers and the toes turning white when exposed to cold weather  Patient states somewhat painful however whiteness improves with rewarming  Patient states has been worked up for autoimmune in the past which was reported negative    Previous BIANKA negative  Review of Systems   Review of Systems   per HPI   all other systems negative    Current Medications       Current Outpatient Medications:     aspirin 81 mg chewable tablet, Chew 81 mg daily, Disp: , Rfl:     famotidine (PEPCID) 20 mg tablet, Take 1 tablet (20 mg total) by mouth 2 (two) times a day, Disp: 60 tablet, Rfl: 11    fexofenadine (ALLEGRA) 180 MG tablet, Take 1 tablet (180 mg total) by mouth daily IN AM, Disp: 90 tablet, Rfl: 3    hydrOXYzine HCL (ATARAX) 10 mg tablet, Take 1 tablet (10 mg total) by mouth 3 (three) times a day as needed for anxiety, Disp: 90 tablet, Rfl: 0    levocetirizine (XYZAL) 5 MG tablet, Take 1 tablet (5 mg total) by mouth every evening, Disp: 90 tablet, Rfl: 3    loratadine-pseudoephedrine (Claritin-D 24 Hour)  mg per 24 hr tablet, Take 1 tablet by mouth daily , Disp: , Rfl:     omeprazole (PriLOSEC) 40 MG capsule, take 1 capsule by mouth once daily, Disp: 90 capsule, Rfl: 0    ergocalciferol (VITAMIN D2) 50,000 units, take 1 capsule by mouth every week (Patient not taking: Reported on 4/20/2021), Disp: 4 capsule, Rfl: 2    Current Allergies     Allergies as of 04/20/2021 - Reviewed 04/20/2021   Allergen Reaction Noted    Shellfish-derived products - food allergy Anaphylaxis 04/12/2019            The following portions of the patient's history were reviewed and updated as appropriate: allergies, current medications, past family history, past medical history, past social history, past surgical history and problem list      Past Medical History:   Diagnosis Date    Abnormal heart rhythm     Anxiety disorder 6/5/2015    Disease of thyroid gland     hashimotos    Headache     Heartburn     History of chickenpox     Hives     TIA (transient ischemic attack)        Past Surgical History:   Procedure Laterality Date    OTHER SURGICAL HISTORY      Lymphatic surgery    WISDOM TOOTH EXTRACTION         Family History   Problem Relation Age of Onset    Arthritis Mother     Thyroid cancer Mother     Arthritis Father     Thyroid cancer Maternal Aunt     Thyroid disease unspecified Sister     Rheum arthritis Sister     Thyroid disease unspecified Sister     Thyroid disease unspecified Sister          Medications have been verified  Objective   /70   Pulse 88   Temp 97 9 °F (36 6 °C) (Tympanic)   Resp 18   Ht 5' 6" (1 676 m)   Wt 107 kg (234 lb 12 8 oz)   SpO2 96%   BMI 37 90 kg/m²        Physical Exam     Physical Exam  Vitals signs and nursing note reviewed  Constitutional:       General: She is not in acute distress  Appearance: She is not ill-appearing, toxic-appearing or diaphoretic  HENT:      Nose: Nose normal       Mouth/Throat:      Pharynx: Oropharynx is clear  Eyes:      Conjunctiva/sclera: Conjunctivae normal    Cardiovascular:      Rate and Rhythm: Normal rate and regular rhythm  Heart sounds: Normal heart sounds  Pulmonary:      Effort: Pulmonary effort is normal  No respiratory distress  Breath sounds: Normal breath sounds  Abdominal:      General: Bowel sounds are normal       Tenderness: There is no abdominal tenderness  Musculoskeletal:      Right lower leg: No edema  Left lower leg: No edema  Skin:     Capillary Refill: Capillary refill takes less than 2 seconds  Neurological:      General: No focal deficit present  Mental Status: She is alert     Psychiatric:         Mood and Affect: Mood normal

## 2021-04-21 ENCOUNTER — TELEMEDICINE (OUTPATIENT)
Dept: BEHAVIORAL/MENTAL HEALTH CLINIC | Facility: HOME HEALTHCARE | Age: 36
End: 2021-04-21

## 2021-04-21 DIAGNOSIS — F41.9 ANXIETY: Primary | ICD-10-CM

## 2021-04-22 PROBLEM — Z13.0 SCREENING FOR ENDOCRINE, NUTRITIONAL, METABOLIC AND IMMUNITY DISORDER: Status: ACTIVE | Noted: 2021-04-22

## 2021-04-22 PROBLEM — E55.9 VITAMIN D DEFICIENCY: Status: ACTIVE | Noted: 2021-04-22

## 2021-04-22 PROBLEM — Z13.29 SCREENING FOR ENDOCRINE, NUTRITIONAL, METABOLIC AND IMMUNITY DISORDER: Status: ACTIVE | Noted: 2021-04-22

## 2021-04-22 PROBLEM — Q21.12 PFO (PATENT FORAMEN OVALE): Status: ACTIVE | Noted: 2021-04-22

## 2021-04-22 PROBLEM — Q21.1 PFO (PATENT FORAMEN OVALE): Status: ACTIVE | Noted: 2021-04-22

## 2021-04-22 PROBLEM — Z13.228 SCREENING FOR ENDOCRINE, NUTRITIONAL, METABOLIC AND IMMUNITY DISORDER: Status: ACTIVE | Noted: 2021-04-22

## 2021-04-22 PROBLEM — I73.00 RAYNAUD'S PHENOMENON WITHOUT GANGRENE: Status: ACTIVE | Noted: 2021-04-22

## 2021-04-22 PROBLEM — Z13.21 SCREENING FOR ENDOCRINE, NUTRITIONAL, METABOLIC AND IMMUNITY DISORDER: Status: ACTIVE | Noted: 2021-04-22

## 2021-04-28 ENCOUNTER — RA CDI HCC (OUTPATIENT)
Dept: OTHER | Facility: HOSPITAL | Age: 36
End: 2021-04-28

## 2021-04-28 NOTE — PROGRESS NOTES
[facility]  coding opportunities          Chart reviewed, no opportunity found: CHART REVIEWED, NO OPPORTUNITY FOUND              Patients insurance company: [employer] (Medicare Advantage and Commercial)

## 2021-04-29 NOTE — PSYCH
Individual Psychotherapy: 3:57-4:40 pm    Goals addressed in session: goal 1    Interventions used in session: LCSW used a client center approach  LCSW used psychoeducation to help work on goal and reflective listening  D: LCSW called and spoke to client for virtual session  It was my intent to perform this visit via video technology, but the patient was not able to do a video connection, so the visit was completed via audio telephone only  LCSW discussed she was in a room by herself, with the door closed, and this was a billable service  Client gave permission to have session via phone  LCSW and client processed client's current feelings  " I am okay" client reported  Client reported she had a rough few weeks  Client reported high anxiety symptoms and struggling to control her feelings  LCSW used psychoeducation in session to help client understand her diagnosis  LCSW and client continued to work on using her coping skills consistently  LCSW and client discussed implementing tea and fidgets to help her control her anxiety  Client also gave permission to talk to PCP to help with medication management  Client was engaged in session  A: client was oriented to time, place, and person  Client did not present with self harm or homicidal thoughts  Client had normal thoughts and speech  P: client will meet in 2 weeks for session    Pain: no pain reported    Current suicide risk : Effie 1153: Diagnosis and Treatment Plan explained to Melo Ho  relates understanding diagnosis and is agreeable to Treatment Plan  Yes

## 2021-05-04 ENCOUNTER — OFFICE VISIT (OUTPATIENT)
Dept: FAMILY MEDICINE CLINIC | Facility: HOME HEALTHCARE | Age: 36
End: 2021-05-04
Payer: COMMERCIAL

## 2021-05-04 VITALS
OXYGEN SATURATION: 100 % | HEART RATE: 76 BPM | RESPIRATION RATE: 18 BRPM | HEIGHT: 66 IN | SYSTOLIC BLOOD PRESSURE: 110 MMHG | DIASTOLIC BLOOD PRESSURE: 70 MMHG | BODY MASS INDEX: 37.83 KG/M2 | WEIGHT: 235.4 LBS | TEMPERATURE: 98.9 F

## 2021-05-04 DIAGNOSIS — Q21.1 PFO (PATENT FORAMEN OVALE): ICD-10-CM

## 2021-05-04 DIAGNOSIS — E78.00 HYPERCHOLESTEROLEMIA: ICD-10-CM

## 2021-05-04 DIAGNOSIS — F41.9 ANXIETY AND DEPRESSION: Primary | ICD-10-CM

## 2021-05-04 DIAGNOSIS — F32.A ANXIETY AND DEPRESSION: Primary | ICD-10-CM

## 2021-05-04 DIAGNOSIS — I73.00 RAYNAUD'S PHENOMENON WITHOUT GANGRENE: ICD-10-CM

## 2021-05-04 DIAGNOSIS — E55.9 VITAMIN D INSUFFICIENCY: ICD-10-CM

## 2021-05-04 PROCEDURE — 99214 OFFICE O/P EST MOD 30 MIN: CPT | Performed by: FAMILY MEDICINE

## 2021-05-04 RX ORDER — CITALOPRAM 10 MG/1
10 TABLET ORAL DAILY
Qty: 30 TABLET | Refills: 0 | Status: SHIPPED | OUTPATIENT
Start: 2021-05-04 | End: 2021-05-29 | Stop reason: SDUPTHER

## 2021-05-04 NOTE — PROGRESS NOTES
[address]       NAME: Herbert Bauer is a 31 y o  female  : 1985    MRN: [id number]  DATE: May 4, 2021  TIME: 8:17 AM    Assessment and Plan   Diagnoses and all orders for this visit:    Anxiety and depression  -     citalopram (CeleXA) 10 mg tablet; Take 1 tablet (10 mg total) by mouth daily    Raynaud's phenomenon without gangrene    PFO (patent foramen ovale)    Vitamin D insufficiency    Hypercholesterolemia        No problem-specific Assessment & Plan notes found for this encounter  Plan  Patient will follow-up in 2-3 weeks or sooner if needed  Patient will continue to follow with Mental Health has an appointment next week  Patient started on Celexa 10 mg and will likely titrate up next week  RF and BIANKA pending  No further issues with Raynaud's symptoms  Patient will work on diet exercise for her elevated total cholesterol/LDL  Will continue OTC vitamin-D supplement  Chief Complaint     Chief Complaint   Patient presents with    Follow-up         History of Present Illness       HPI   68-year-old female presents today for follow-up visit for her Raynaud's phenomena  Patient states since warmer weather has not had any further episodes  Reviewed lab work with patient showing mildly elevated hemoglobin, elevated total cholesterol, LDL  Patient states would like to work on diet and exercise and not be started on a statin at this time  Patient also with mildly decreased vitamin-D/vitamin-D insufficiency and will take OTC supplement  Will recheck labs in 3-6 months  Patient has BIANKA and RF pending  If positive will consider sending patient to Rheumatology  Patient was seen by social work [facility] for her anxiety and depression who recommended patient being started on SSRI and recommending Celexa  Patient did have adverse reaction to Lexapro and Zoloft in the past  But willing to try new medication    Patient instructed if any adverse effects please give me a call and we will discontinue/taper off medication  Patient denies any suicidal ideation thoughts of hurting others  Review of Systems   Review of Systems   Constitutional: Negative  HENT: Negative  Respiratory: Negative  Cardiovascular: Negative  Gastrointestinal: Negative  Genitourinary: Negative  Musculoskeletal: Negative  Skin: Negative  Psychiatric/Behavioral: Negative for suicidal ideas  The patient is nervous/anxious  All other systems reviewed and are negative          Current Medications       Current Outpatient Medications:     aspirin 81 mg chewable tablet, Chew 81 mg daily, Disp: , Rfl:     famotidine (PEPCID) 20 mg tablet, Take 1 tablet (20 mg total) by mouth 2 (two) times a day, Disp: 60 tablet, Rfl: 11    fexofenadine (ALLEGRA) 180 MG tablet, Take 1 tablet (180 mg total) by mouth daily IN AM, Disp: 90 tablet, Rfl: 3    hydrOXYzine HCL (ATARAX) 10 mg tablet, Take 1 tablet (10 mg total) by mouth 3 (three) times a day as needed for anxiety, Disp: 90 tablet, Rfl: 0    levocetirizine (XYZAL) 5 MG tablet, Take 1 tablet (5 mg total) by mouth every evening, Disp: 90 tablet, Rfl: 3    loratadine-pseudoephedrine (Claritin-D 24 Hour)  mg per 24 hr tablet, Take 1 tablet by mouth daily , Disp: , Rfl:     omeprazole (PriLOSEC) 40 MG capsule, take 1 capsule by mouth once daily, Disp: 90 capsule, Rfl: 0    citalopram (CeleXA) 10 mg tablet, Take 1 tablet (10 mg total) by mouth daily, Disp: 30 tablet, Rfl: 0    ergocalciferol (VITAMIN D2) 50,000 units, take 1 capsule by mouth every week (Patient not taking: Reported on 4/20/2021), Disp: 4 capsule, Rfl: 2    Current Allergies     Allergies as of 05/04/2021 - Reviewed 05/04/2021   Allergen Reaction Noted    Shellfish-derived products - food allergy Anaphylaxis 04/12/2019            The following portions of the patient's history were reviewed and updated as appropriate: allergies, current medications, past family history, past medical history, past social history, past surgical history and problem list      Past Medical History:   Diagnosis Date    Abnormal heart rhythm     Anxiety disorder 6/5/2015    Disease of thyroid gland     hashimotos    Headache     Heartburn     History of chickenpox     Hives     TIA (transient ischemic attack)        Past Surgical History:   Procedure Laterality Date    OTHER SURGICAL HISTORY      Lymphatic surgery    WISDOM TOOTH EXTRACTION         Family History   Problem Relation Age of Onset    Arthritis Mother     Thyroid cancer Mother     Arthritis Father     Thyroid cancer Maternal Aunt     Thyroid disease unspecified Sister     Rheum arthritis Sister     Thyroid disease unspecified Sister     Thyroid disease unspecified Sister          Medications have been verified  Objective   /70   Pulse 76   Temp 98 9 °F (37 2 °C) (Tympanic)   Resp 18   Ht 5' 6" (1 676 m)   Wt 107 kg (235 lb 6 4 oz)   SpO2 100%   BMI 37 99 kg/m²        Physical Exam     Physical Exam  Vitals signs and nursing note reviewed  Constitutional:       General: She is not in acute distress  Appearance: She is not ill-appearing, toxic-appearing or diaphoretic  HENT:      Head: Normocephalic  Nose: Nose normal       Mouth/Throat:      Mouth: Mucous membranes are moist       Pharynx: Oropharynx is clear  Eyes:      General: No scleral icterus  Conjunctiva/sclera: Conjunctivae normal    Cardiovascular:      Rate and Rhythm: Normal rate and regular rhythm  Heart sounds: Normal heart sounds  Pulmonary:      Effort: Pulmonary effort is normal  No respiratory distress  Breath sounds: Normal breath sounds  Abdominal:      General: Bowel sounds are normal       Tenderness: There is no abdominal tenderness  Musculoskeletal:         General: No tenderness  Right lower leg: No edema  Left lower leg: No edema     Skin:     Capillary Refill: Capillary refill takes less than 2 seconds  Neurological:      General: No focal deficit present  Mental Status: She is alert and oriented to person, place, and time     Psychiatric:         Mood and Affect: Mood normal

## 2021-05-06 ENCOUNTER — TELEMEDICINE (OUTPATIENT)
Dept: BEHAVIORAL/MENTAL HEALTH CLINIC | Facility: CLINIC | Age: 36
End: 2021-05-06

## 2021-05-06 DIAGNOSIS — F41.9 ANXIETY: Primary | ICD-10-CM

## 2021-05-13 NOTE — PSYCH
Individual Psychotherapy: 4-4:39 pm    Goals addressed in session: goal 1    Interventions used in session: LCSW used a client center approach  LCSW used role modeling in session  D: LCSW called and spoke to client for virtual session  It was my intent to perform this visit via video technology, but the patient was not able to do a video connection, so the visit was completed via audio telephone only  LCSW discussed she was in a room by herself, with the door closed, and this was a billable service  Client gave permission to have session via phone  LCSW and client processed client's current feelings  " I am okay" client reported  Client reported she was still experiencing anxiety symptoms  Client reported she has not started the new medication yet provided by her PCP but will do this soon  Client reported she was using her skills but felt overwhelmed still  LCSW validated her feelings  LCSW role modeled for client on how she can implement her skills daily  Client reported she was trying but felt she was always busy  LCSW validated these feelings and discussed what client could do  Client was able to discuss what skills she can use when she is able to do so  A: client was oriented to time, place, and person  Client did not present with self harm or homicidal thoughts  P: client will meet in 2 weeks for session    Pain: no pain discussed    Current suicide risk : Effie 1153: Diagnosis and Treatment Plan explained to Vallie Clubs  relates understanding diagnosis and is agreeable to Treatment Plan  Yes

## 2021-05-20 ENCOUNTER — TELEMEDICINE (OUTPATIENT)
Dept: BEHAVIORAL/MENTAL HEALTH CLINIC | Facility: CLINIC | Age: 36
End: 2021-05-20

## 2021-05-20 DIAGNOSIS — F41.9 ANXIETY: ICD-10-CM

## 2021-05-29 DIAGNOSIS — F32.A ANXIETY AND DEPRESSION: ICD-10-CM

## 2021-05-29 DIAGNOSIS — F41.9 ANXIETY AND DEPRESSION: ICD-10-CM

## 2021-05-29 RX ORDER — CITALOPRAM 10 MG/1
TABLET ORAL
Qty: 30 TABLET | Refills: 0 | Status: SHIPPED | OUTPATIENT
Start: 2021-05-29 | End: 2021-06-11 | Stop reason: SDUPTHER

## 2021-06-01 ENCOUNTER — APPOINTMENT (OUTPATIENT)
Dept: LAB | Facility: CLINIC | Age: 36
End: 2021-06-01
Payer: COMMERCIAL

## 2021-06-01 DIAGNOSIS — Z13.21 SCREENING FOR ENDOCRINE, NUTRITIONAL, METABOLIC AND IMMUNITY DISORDER: ICD-10-CM

## 2021-06-01 DIAGNOSIS — Z13.0 SCREENING FOR ENDOCRINE, NUTRITIONAL, METABOLIC AND IMMUNITY DISORDER: ICD-10-CM

## 2021-06-01 DIAGNOSIS — E55.9 VITAMIN D DEFICIENCY: ICD-10-CM

## 2021-06-01 DIAGNOSIS — I73.00 RAYNAUD'S PHENOMENON WITHOUT GANGRENE: ICD-10-CM

## 2021-06-01 DIAGNOSIS — Z13.228 SCREENING FOR ENDOCRINE, NUTRITIONAL, METABOLIC AND IMMUNITY DISORDER: ICD-10-CM

## 2021-06-01 DIAGNOSIS — Z13.29 SCREENING FOR ENDOCRINE, NUTRITIONAL, METABOLIC AND IMMUNITY DISORDER: ICD-10-CM

## 2021-06-01 LAB
25(OH)D3 SERPL-MCNC: 22.1 NG/ML (ref 30–100)
ALBUMIN SERPL BCP-MCNC: 3.7 G/DL (ref 3.5–5)
ALP SERPL-CCNC: 68 U/L (ref 46–116)
ALT SERPL W P-5'-P-CCNC: 30 U/L (ref 12–78)
ANION GAP SERPL CALCULATED.3IONS-SCNC: 7 MMOL/L (ref 4–13)
AST SERPL W P-5'-P-CCNC: 18 U/L (ref 5–45)
BASOPHILS # BLD AUTO: 0.05 THOUSANDS/ΜL (ref 0–0.1)
BASOPHILS NFR BLD AUTO: 1 % (ref 0–1)
BILIRUB SERPL-MCNC: 0.56 MG/DL (ref 0.2–1)
BUN SERPL-MCNC: 11 MG/DL (ref 5–25)
CALCIUM SERPL-MCNC: 9.3 MG/DL (ref 8.3–10.1)
CHLORIDE SERPL-SCNC: 107 MMOL/L (ref 100–108)
CHOLEST SERPL-MCNC: 169 MG/DL (ref 50–200)
CO2 SERPL-SCNC: 27 MMOL/L (ref 21–32)
CREAT SERPL-MCNC: 0.87 MG/DL (ref 0.6–1.3)
EOSINOPHIL # BLD AUTO: 0.06 THOUSAND/ΜL (ref 0–0.61)
EOSINOPHIL NFR BLD AUTO: 1 % (ref 0–6)
ERYTHROCYTE [DISTWIDTH] IN BLOOD BY AUTOMATED COUNT: 12.4 % (ref 11.6–15.1)
GFR SERPL CREATININE-BSD FRML MDRD: 87 ML/MIN/1.73SQ M
GLUCOSE P FAST SERPL-MCNC: 74 MG/DL (ref 65–99)
HCT VFR BLD AUTO: 46.5 % (ref 34.8–46.1)
HDLC SERPL-MCNC: 44 MG/DL
HGB BLD-MCNC: 15.3 G/DL (ref 11.5–15.4)
IMM GRANULOCYTES # BLD AUTO: 0.03 THOUSAND/UL (ref 0–0.2)
IMM GRANULOCYTES NFR BLD AUTO: 0 % (ref 0–2)
LDLC SERPL CALC-MCNC: 102 MG/DL (ref 0–100)
LYMPHOCYTES # BLD AUTO: 2.21 THOUSANDS/ΜL (ref 0.6–4.47)
LYMPHOCYTES NFR BLD AUTO: 23 % (ref 14–44)
MCH RBC QN AUTO: 30.8 PG (ref 26.8–34.3)
MCHC RBC AUTO-ENTMCNC: 32.9 G/DL (ref 31.4–37.4)
MCV RBC AUTO: 94 FL (ref 82–98)
MONOCYTES # BLD AUTO: 0.83 THOUSAND/ΜL (ref 0.17–1.22)
MONOCYTES NFR BLD AUTO: 9 % (ref 4–12)
NEUTROPHILS # BLD AUTO: 6.49 THOUSANDS/ΜL (ref 1.85–7.62)
NEUTS SEG NFR BLD AUTO: 66 % (ref 43–75)
NONHDLC SERPL-MCNC: 125 MG/DL
NRBC BLD AUTO-RTO: 0 /100 WBCS
PLATELET # BLD AUTO: 317 THOUSANDS/UL (ref 149–390)
PMV BLD AUTO: 10.5 FL (ref 8.9–12.7)
POTASSIUM SERPL-SCNC: 3.7 MMOL/L (ref 3.5–5.3)
PROT SERPL-MCNC: 7.7 G/DL (ref 6.4–8.2)
RBC # BLD AUTO: 4.97 MILLION/UL (ref 3.81–5.12)
SODIUM SERPL-SCNC: 141 MMOL/L (ref 136–145)
TRIGL SERPL-MCNC: 116 MG/DL
TSH SERPL DL<=0.05 MIU/L-ACNC: 0.5 UIU/ML (ref 0.36–3.74)
WBC # BLD AUTO: 9.67 THOUSAND/UL (ref 4.31–10.16)

## 2021-06-01 PROCEDURE — 82306 VITAMIN D 25 HYDROXY: CPT

## 2021-06-01 PROCEDURE — 84443 ASSAY THYROID STIM HORMONE: CPT

## 2021-06-01 PROCEDURE — 36415 COLL VENOUS BLD VENIPUNCTURE: CPT

## 2021-06-01 PROCEDURE — 80061 LIPID PANEL: CPT

## 2021-06-01 PROCEDURE — 86430 RHEUMATOID FACTOR TEST QUAL: CPT

## 2021-06-01 PROCEDURE — 80053 COMPREHEN METABOLIC PANEL: CPT

## 2021-06-01 PROCEDURE — 85025 COMPLETE CBC W/AUTO DIFF WBC: CPT

## 2021-06-01 PROCEDURE — 86038 ANTINUCLEAR ANTIBODIES: CPT

## 2021-06-02 LAB
RHEUMATOID FACT SER QL LA: NEGATIVE
RYE IGE QN: NEGATIVE

## 2021-06-02 NOTE — PSYCH
Individual Psychotherapy: 4-4:30 pm    Goals addressed in session: goal 1    Interventions used in session: LCSW used a client center approach  LCSW used reflective listening in session  D: LCSW called and spoke to client for virtual session  It was my intent to perform this visit via video technology, but the patient was not able to do a video connection, so the visit was completed via audio telephone only  LCSW discussed she was in a room by herself, with the door closed, and this was a billable service  Client gave permission to have session via phone  LCSW and client processed client's current feelings  " I am good" client reported  Client reported she was doing well overall and had not had an increase in her anxiety symptoms  Client reported she was doing her skills and felt that was helping  LCSW used reflective listening in session  A: client was oriented to time, place, and person  Client did not present with self harm or homicidal thoughts  P: client will meet in 2 weeks  Pain: no pain reported     Current suicide risk : Effie 1153: Diagnosis and Treatment Plan explained to Bebe Bird  relates understanding diagnosis and is agreeable to Treatment Plan  Yes

## 2021-06-04 ENCOUNTER — RA CDI HCC (OUTPATIENT)
Dept: OTHER | Facility: HOSPITAL | Age: 36
End: 2021-06-04

## 2021-06-04 NOTE — PROGRESS NOTES
[facility] [employer]  coding opportunities       Chart reviewed, (number of) suggestions sent to provider: 1            Number of suggestions NOT actually used: 1     Patients insurance company: [employer] (Medicare Advantage and Commercial)     Visit status: Patient arrived for their scheduled appointment     Provider never responded to [facility]  coding request     [facility] [employer] [facility]  coding opportunities        Chart reviewed, (number of) suggestions sent to provider: 1     Patients insurance company: [employer] [place] [address] (Medicare Advantage and Commercial)           Depression - Can Depression be further classified using more specific code from F32 0 - F32 5 or F33 0 - F33 9

## 2021-06-09 ENCOUNTER — OFFICE VISIT (OUTPATIENT)
Dept: FAMILY MEDICINE CLINIC | Facility: HOME HEALTHCARE | Age: 36
End: 2021-06-09
Payer: COMMERCIAL

## 2021-06-09 VITALS
TEMPERATURE: 98.6 F | SYSTOLIC BLOOD PRESSURE: 128 MMHG | HEART RATE: 88 BPM | RESPIRATION RATE: 18 BRPM | OXYGEN SATURATION: 98 % | HEIGHT: 66 IN | DIASTOLIC BLOOD PRESSURE: 70 MMHG | BODY MASS INDEX: 37.16 KG/M2 | WEIGHT: 231.2 LBS

## 2021-06-09 DIAGNOSIS — K21.9 GASTROESOPHAGEAL REFLUX DISEASE WITHOUT ESOPHAGITIS: ICD-10-CM

## 2021-06-09 DIAGNOSIS — F41.9 ANXIETY AND DEPRESSION: Primary | ICD-10-CM

## 2021-06-09 DIAGNOSIS — F32.A ANXIETY AND DEPRESSION: Primary | ICD-10-CM

## 2021-06-09 DIAGNOSIS — E55.9 VITAMIN D DEFICIENCY: ICD-10-CM

## 2021-06-09 DIAGNOSIS — Q21.1 PFO (PATENT FORAMEN OVALE): ICD-10-CM

## 2021-06-09 DIAGNOSIS — I73.00 RAYNAUD'S PHENOMENON WITHOUT GANGRENE: ICD-10-CM

## 2021-06-09 DIAGNOSIS — E55.9 VITAMIN D INSUFFICIENCY: ICD-10-CM

## 2021-06-09 PROCEDURE — 99213 OFFICE O/P EST LOW 20 MIN: CPT | Performed by: FAMILY MEDICINE

## 2021-06-09 RX ORDER — ERGOCALCIFEROL 1.25 MG/1
50000 CAPSULE ORAL WEEKLY
Qty: 4 CAPSULE | Refills: 5 | Status: SHIPPED | OUTPATIENT
Start: 2021-06-09 | End: 2021-12-09 | Stop reason: ALTCHOICE

## 2021-06-09 RX ORDER — OMEPRAZOLE 40 MG/1
40 CAPSULE, DELAYED RELEASE ORAL DAILY
Qty: 90 CAPSULE | Refills: 0 | Status: SHIPPED | OUTPATIENT
Start: 2021-06-09

## 2021-06-09 NOTE — PROGRESS NOTES
[address]       NAME: Al Strickland is a 31 y o  female  : 1985    MRN: [id number]  DATE: 2021  TIME: 11:07 AM    Assessment and Plan   Diagnoses and all orders for this visit:    Anxiety and depression    Gastroesophageal reflux disease without esophagitis  -     omeprazole (PriLOSEC) 40 MG capsule; Take 1 capsule (40 mg total) by mouth daily    PFO (patent foramen ovale)    Vitamin D insufficiency    Vitamin D deficiency  -     ergocalciferol (VITAMIN D2) 50,000 units; Take 1 capsule (50,000 Units total) by mouth once a week    Raynaud's phenomenon without gangrene        No problem-specific Assessment & Plan notes found for this encounter  Plan       Patient will follow-up in 6 months or sooner if needed  Patient will continue to follow with behavior health  States has notice improvement in her symptoms with the Celexa which is at 10 mg daily  Did discuss increasing dose but patient states feels 10 mg is working  Patient instructed to call me with any questions or concerns  Chief Complaint     Chief Complaint   Patient presents with    Follow-up     no acute complaints per pt         History of Present Illness       HPI    82-year-old female presents today for routine follow-up visit  Patient states her anxiety and depression has improved since being placed on the Celexa  Patient states rarely uses her Atarax  Patient has no new complaints today  Denies any suicidal ideation or thoughts of hurting others  Restarted patient back on vitamin-D supplementation  Reorder her Prilosec which he states helps with her reflux symptoms and encouraged patient not to approximately 3 hours prior to going to bed  Review of Systems   Review of Systems   Constitutional: Negative  HENT: Negative  Eyes: Negative  Respiratory: Negative  Gastrointestinal: Negative  Genitourinary: Negative  Neurological: Negative  Hematological: Negative  Psychiatric/Behavioral: Negative for self-injury and suicidal ideas  All other systems reviewed and are negative          Current Medications       Current Outpatient Medications:     aspirin 81 mg chewable tablet, Chew 81 mg daily, Disp: , Rfl:     citalopram (CeleXA) 10 mg tablet, take 1 tablet by mouth once daily, Disp: 30 tablet, Rfl: 0    famotidine (PEPCID) 20 mg tablet, Take 1 tablet (20 mg total) by mouth 2 (two) times a day, Disp: 60 tablet, Rfl: 11    fexofenadine (ALLEGRA) 180 MG tablet, Take 1 tablet (180 mg total) by mouth daily IN AM, Disp: 90 tablet, Rfl: 3    hydrOXYzine HCL (ATARAX) 10 mg tablet, Take 1 tablet (10 mg total) by mouth 3 (three) times a day as needed for anxiety, Disp: 90 tablet, Rfl: 0    levocetirizine (XYZAL) 5 MG tablet, Take 1 tablet (5 mg total) by mouth every evening, Disp: 90 tablet, Rfl: 3    loratadine-pseudoephedrine (Claritin-D 24 Hour)  mg per 24 hr tablet, Take 1 tablet by mouth daily , Disp: , Rfl:     omeprazole (PriLOSEC) 40 MG capsule, Take 1 capsule (40 mg total) by mouth daily, Disp: 90 capsule, Rfl: 0    ergocalciferol (VITAMIN D2) 50,000 units, Take 1 capsule (50,000 Units total) by mouth once a week, Disp: 4 capsule, Rfl: 5    Current Allergies     Allergies as of 06/09/2021 - Reviewed 06/09/2021   Allergen Reaction Noted    Shellfish-derived products - food allergy Anaphylaxis 04/12/2019            The following portions of the patient's history were reviewed and updated as appropriate: allergies, current medications, past family history, past medical history, past social history, past surgical history and problem list      Past Medical History:   Diagnosis Date    Abnormal heart rhythm     Anxiety disorder 6/5/2015    Disease of thyroid gland     hashimotos    Headache     Heartburn     History of chickenpox     Hives     TIA (transient ischemic attack)        Past Surgical History:   Procedure Laterality Date    OTHER SURGICAL HISTORY      Lymphatic surgery    WISDOM TOOTH EXTRACTION         Family History   Problem Relation Age of Onset    Arthritis Mother     Thyroid cancer Mother     Arthritis Father     Thyroid cancer Maternal Aunt     Thyroid disease unspecified Sister     Rheum arthritis Sister     Thyroid disease unspecified Sister     Thyroid disease unspecified Sister          Medications have been verified  Objective   /70   Pulse 88   Temp 98 6 °F (37 °C) (Tympanic)   Resp 18   Ht 5' 6" (1 676 m)   Wt 105 kg (231 lb 3 2 oz)   SpO2 98%   BMI 37 32 kg/m²        Physical Exam     Physical Exam  Vitals signs and nursing note reviewed  Constitutional:       General: She is not in acute distress  Appearance: She is well-developed  She is not ill-appearing, toxic-appearing or diaphoretic  HENT:      Head: Normocephalic and atraumatic  Nose: Nose normal       Mouth/Throat:      Mouth: Mucous membranes are moist       Pharynx: Oropharynx is clear  No oropharyngeal exudate  Eyes:      General: No scleral icterus  Conjunctiva/sclera: Conjunctivae normal    Neck:      Musculoskeletal: Normal range of motion and neck supple  Trachea: No tracheal deviation  Cardiovascular:      Rate and Rhythm: Normal rate and regular rhythm  Heart sounds: Normal heart sounds  Pulmonary:      Effort: Pulmonary effort is normal       Breath sounds: Normal breath sounds  Abdominal:      General: Bowel sounds are normal       Palpations: Abdomen is soft  Tenderness: There is no abdominal tenderness  Musculoskeletal:         General: No tenderness  Right lower leg: No edema  Left lower leg: No edema  Lymphadenopathy:      Cervical: No cervical adenopathy  Skin:     General: Skin is warm and dry  Capillary Refill: Capillary refill takes less than 2 seconds  Findings: No rash  Neurological:      General: No focal deficit present        Mental Status: She is alert and oriented to person, place, and time  Motor: No abnormal muscle tone        Coordination: Coordination normal    Psychiatric:         Mood and Affect: Mood normal

## 2021-06-10 ENCOUNTER — TELEMEDICINE (OUTPATIENT)
Dept: BEHAVIORAL/MENTAL HEALTH CLINIC | Facility: HOME HEALTHCARE | Age: 36
End: 2021-06-10

## 2021-06-10 DIAGNOSIS — F32.A DEPRESSION, UNSPECIFIED DEPRESSION TYPE: ICD-10-CM

## 2021-06-10 DIAGNOSIS — F41.9 ANXIETY: Primary | ICD-10-CM

## 2021-06-11 DIAGNOSIS — F41.9 ANXIETY AND DEPRESSION: ICD-10-CM

## 2021-06-11 DIAGNOSIS — F32.A ANXIETY AND DEPRESSION: ICD-10-CM

## 2021-06-14 RX ORDER — CITALOPRAM 10 MG/1
10 TABLET ORAL DAILY
Qty: 30 TABLET | Refills: 0 | Status: SHIPPED | OUTPATIENT
Start: 2021-06-14 | End: 2021-07-08 | Stop reason: DRUGHIGH

## 2021-06-23 NOTE — PSYCH
Individual Psychotherapy: 4:12-4:46 pm    Goals addressed in session: goal 1    Interventions used in session: LCSW used a client center approach  LCSW used Psychoeducation in session  D: LCSW called and spoke to client for virtual session  It was my intent to perform this visit via video technology, but the patient was not able to do a video connection, so the visit was completed via audio telephone only  LCSW discussed she was in a room by herself, with the door closed, and this was a billable service  Client gave permission to have session via phone  LCSW and client processed client's current feelings  " I am doing okay" client reported  Client reported she had her visit with her PCP and he did not make any changes  Client reported she felt a decrease in her anxiety symptoms, however, an increase in her grief symptoms  LCSW used psychoeducation to help client understand the stages of grief  LCSW and client discussed what skills she could use and what resources she could use to help her with her grief  A: client was oriented to time, place, and person  Client did not present with self harm or homicidal thoughts  P: client will meet in 2 weeks for session  Pain: no pain reported    Current suicide risk : Effie 1153: Diagnosis and Treatment Plan explained to Naman Alcala  relates understanding diagnosis and is agreeable to Treatment Plan  Yes

## 2021-07-08 DIAGNOSIS — F32.A ANXIETY AND DEPRESSION: Primary | ICD-10-CM

## 2021-07-08 DIAGNOSIS — F41.9 ANXIETY AND DEPRESSION: Primary | ICD-10-CM

## 2021-07-08 RX ORDER — CITALOPRAM 20 MG/1
20 TABLET ORAL DAILY
Qty: 30 TABLET | Refills: 5 | Status: SHIPPED | OUTPATIENT
Start: 2021-07-08 | End: 2022-01-03

## 2021-07-15 ENCOUNTER — DOCUMENTATION (OUTPATIENT)
Dept: BEHAVIORAL/MENTAL HEALTH CLINIC | Facility: HOME HEALTHCARE | Age: 36
End: 2021-07-15

## 2021-07-20 ENCOUNTER — TELEMEDICINE (OUTPATIENT)
Dept: BEHAVIORAL/MENTAL HEALTH CLINIC | Facility: CLINIC | Age: 36
End: 2021-07-20

## 2021-07-20 DIAGNOSIS — F32.A DEPRESSION, UNSPECIFIED DEPRESSION TYPE: ICD-10-CM

## 2021-07-20 DIAGNOSIS — F41.9 ANXIETY: Primary | ICD-10-CM

## 2021-07-28 ENCOUNTER — DOCUMENTATION (OUTPATIENT)
Dept: BEHAVIORAL/MENTAL HEALTH CLINIC | Facility: HOME HEALTHCARE | Age: 36
End: 2021-07-28

## 2021-07-28 NOTE — PROGRESS NOTES
LCSW called and spoke to client   LCSW discussed she spoke to PCP and PCP will increase her medication client was in agreement

## 2021-08-09 NOTE — PSYCH
Individual Psychotherapy: 4:02-4:46 pm:    Goals addressed in session: goal 1    Interventions used in session: LCSW used a client center approach  LCSW used reflective listening in session    D: LCSW called and spoke to client for virtual session  It was my intent to perform this visit via video technology, but the patient was not able to do a video connection, so the visit was completed via audio telephone only  LCSW discussed she was in a room by herself, with the door closed, and this was a billable service  Client gave permission to have session via phone  LCSW and client processed client's current feelings  " I am okay" client reported  Client reported she was stressed out over her job and felt anxious and irritated  LCSW and client discussed what was going on to have her feel this way  LCSW used reflective listening to help validate client's feelings  A: client was oriented to time, place, and person  Client did not present with self harm or homicidal thoughts  Client had frustrated tones and was engaged in session  P: client will meet in 2 weeks    Pain: no pain discussed    Current suicide risk : Effie 1153: Diagnosis and Treatment Plan explained to Costa Elizabeth relates understanding diagnosis and is agreeable to Treatment Plan  Yes

## 2021-08-26 ENCOUNTER — TELEMEDICINE (OUTPATIENT)
Dept: BEHAVIORAL/MENTAL HEALTH CLINIC | Facility: HOME HEALTHCARE | Age: 36
End: 2021-08-26

## 2021-08-26 DIAGNOSIS — F41.9 ANXIETY: Primary | ICD-10-CM

## 2021-08-26 DIAGNOSIS — F32.A DEPRESSION, UNSPECIFIED DEPRESSION TYPE: ICD-10-CM

## 2021-08-27 NOTE — PSYCH
Individual Psychotherapy:4-4:26 pm    Goals addressed in session: goal 1    Interventions used in session: LCSW used a client center approach  LCSW used reflective listening in session  D: LCSW called and spoke to client for virtual session  It was my intent to perform this visit via video technology, but the patient was not able to do a video connection, so the visit was completed via audio telephone only  LCSW discussed she was in a room by herself, with the door closed, and this was a billable service  Client gave permission to have session via phone  LCSW and client processed client's current feelings  " I am good" client reported  Client reported a decrease in anxiety and depression symptoms  Client reported she felt the medication was helping and she has been busy  Client reported she has a new relationship and this was going well  LCSW and client processed how she was using her skills and feels these were helping  LCSW used reflective listening to help client feel heard  A: client was oriented to time, place, and person  Client did not present with self harm or homicidal thoughts  P: client will meet in 2 week    Pain: no pain reported    Current suicide risk : Effie 1153: Diagnosis and Treatment Plan explained to Angelica Rhodes relates understanding diagnosis and is agreeable to Treatment Plan  Yes

## 2021-09-10 ENCOUNTER — TELEMEDICINE (OUTPATIENT)
Dept: BEHAVIORAL/MENTAL HEALTH CLINIC | Facility: HOME HEALTHCARE | Age: 36
End: 2021-09-10
Payer: COMMERCIAL

## 2021-09-10 ENCOUNTER — TELEMEDICINE (OUTPATIENT)
Dept: FAMILY MEDICINE CLINIC | Facility: HOME HEALTHCARE | Age: 36
End: 2021-09-10
Payer: COMMERCIAL

## 2021-09-10 DIAGNOSIS — K52.9 CHRONIC DIARRHEA OF UNKNOWN ORIGIN: Primary | ICD-10-CM

## 2021-09-10 DIAGNOSIS — F41.9 ANXIETY: ICD-10-CM

## 2021-09-10 DIAGNOSIS — F32.4 DEPRESSION, MAJOR, SINGLE EPISODE, IN PARTIAL REMISSION (HCC): Primary | ICD-10-CM

## 2021-09-10 PROCEDURE — G0071 COMM SVCS BY RHC/FQHC 5 MIN: HCPCS | Performed by: FAMILY MEDICINE

## 2021-09-10 NOTE — PROGRESS NOTES
Virtual Regular Visit    Verification of patient location:    Patient is located in the following state in which I hold an active license [place]      Assessment/Plan:    Problem List Items Addressed This Visit     None      Visit Diagnoses     Chronic diarrhea of unknown origin    -  Primary    Relevant Orders    Sedimentation rate, automated    CBC and differential    Stool culture    Giardia and Cryptosporidium Antigen Panel               Reason for visit is   Chief Complaint   Patient presents with    Virtual Brief Visit     diarrhea every time she eats per pt - denies abdominal pain  [id number]  Has had some normal stools   Virtual Regular Visit        Encounter provider Heather Marin PA-C    Provider located at [address] [address]  [phone]      Recent Visits  No visits were found meeting these conditions  Showing recent visits within past 7 days and meeting all other requirements  Today's Visits  Date Type Provider Dept   09/10/21 JB Queen [address] today's visits and meeting all other requirements  Future Appointments  No visits were found meeting these conditions  Showing future appointments within next 150 days and meeting all other requirements       The patient was identified by name and date of birth  Dylan Conley was informed that this is a telemedicine visit and that the visit is being conducted through [address] [place] [address] Now and patient was informed that this is a secure, HIPAA-compliant platform  She agrees to proceed     My office door was closed  No one else was in the room  She acknowledged consent and understanding of privacy and security of the video platform  The patient has agreed to participate and understands they can discontinue the visit at any time  Patient is aware this is a billable service       Subjective  Dylan Conley is a 31 y o  female       HPI 53-year-old female scheduled for virtual visit today due to complaint of chronic diarrhea for approximately 5 months  Patient states typically every day when she eats lunch and also some other meals she has diarrhea after eating  Denies any blood in stool, denies any fevers, chills, abdominal pain or cramping  Denies history of IBS  Denies history of lactose intolerance, also colitis or Crohn's disease  Patient states can not eat any type of food which causes diarrhea  Patient denies any travel however states does do a lot of outdoors with fishing    Past Medical History:   Diagnosis Date    Abnormal heart rhythm     Anxiety disorder 6/5/2015    Disease of thyroid gland     hashimotos    Headache     Heartburn     History of chickenpox     Hives     TIA (transient ischemic attack)        Past Surgical History:   Procedure Laterality Date    OTHER SURGICAL HISTORY      Lymphatic surgery    WISDOM TOOTH EXTRACTION         Current Outpatient Medications   Medication Sig Dispense Refill    aspirin 81 mg chewable tablet Chew 81 mg daily      citalopram (CeleXA) 20 mg tablet Take 1 tablet (20 mg total) by mouth daily 30 tablet 5    ergocalciferol (VITAMIN D2) 50,000 units Take 1 capsule (50,000 Units total) by mouth once a week 4 capsule 5    famotidine (PEPCID) 20 mg tablet Take 1 tablet (20 mg total) by mouth 2 (two) times a day 60 tablet 11    fexofenadine (ALLEGRA) 180 MG tablet Take 1 tablet (180 mg total) by mouth daily IN AM 90 tablet 3    hydrOXYzine HCL (ATARAX) 10 mg tablet Take 1 tablet (10 mg total) by mouth 3 (three) times a day as needed for anxiety 90 tablet 0    loratadine-pseudoephedrine (Claritin-D 24 Hour)  mg per 24 hr tablet Take 1 tablet by mouth daily       omeprazole (PriLOSEC) 40 MG capsule Take 1 capsule (40 mg total) by mouth daily 90 capsule 0    levocetirizine (XYZAL) 5 MG tablet Take 1 tablet (5 mg total) by mouth every evening 90 tablet 3     No current facility-administered medications for this visit  Allergies   Allergen Reactions    Shellfish-Derived Products - Food Allergy Anaphylaxis     Patient states she had an allergy screen and it came back negative  Review of Systems    Per HPI   denies headaches, dizziness, fevers, chills, shortness of breath, abdominal pain, nausea, vomiting or constipation  Denies any chest pain  All other systems negative  Video Exam    There were no vitals filed for this visit  Physical Exam  Constitutional:       General: She is not in acute distress  Appearance: Normal appearance  She is not ill-appearing, toxic-appearing or diaphoretic  HENT:      Head: Normocephalic  Pulmonary:      Effort: No respiratory distress  Abdominal:      Tenderness: There is no abdominal tenderness ( reports no tenderness)  Neurological:      Mental Status: She is alert and oriented to person, place, and time  Psychiatric:         Mood and Affect: Mood normal           I spent 15 minutes directly with the patient during this visit    VIRTUAL VISIT DISCLAIMER      Mirza Amaya verbally agrees to participate in [employer]  Pt is aware that [employer] could be limited without vital signs or the ability to perform a full hands-on physical Ariella Matthew understands she or the provider may request at any time to terminate the video visit and request the patient to seek care or treatment in person

## 2021-09-30 NOTE — PSYCH
Individual Psychotherapy: 3-3:29 pm     Goals addressed in session: goal 1    Interventions used in session: LCSW used a client center approach and reflective listening  D: LCSW called and spoke to client for virtual session  It was my intent to perform this visit via video technology, but the patient was not able to do a video connection, so the visit was completed via audio telephone only  LCSW discussed she was in a room by herself, with the door closed, and this was a billable service  Client gave permission to have session via phone  LCSW and client processed client's current feelings  " I am good" client reported  Client discussed a decrease in her anxiety symptoms  Client reported she was using her skills  LCSW and client discussed decreasing services at the next session and client was in agreement  A: client was oriented to time, place, and person  Client did not present with self harm or homicidal thoughts  Client had normal speech and appeared to be in a pleasant mood  P: client will meet in 2 weeks    Pain: no pain discussed    Current suicide risk : Effie 1153: Diagnosis and Treatment Plan explained to Dania Milner  relates understanding diagnosis and is agreeable to Treatment Plan  Yes

## 2021-10-04 DIAGNOSIS — Z20.822 EXPOSURE TO COVID-19 VIRUS: ICD-10-CM

## 2021-10-04 DIAGNOSIS — B34.9 VIRAL INFECTION, UNSPECIFIED: ICD-10-CM

## 2021-10-04 PROCEDURE — U0003 INFECTIOUS AGENT DETECTION BY NUCLEIC ACID (DNA OR RNA); SEVERE ACUTE RESPIRATORY SYNDROME CORONAVIRUS 2 (SARS-COV-2) (CORONAVIRUS DISEASE [COVID-19]), AMPLIFIED PROBE TECHNIQUE, MAKING USE OF HIGH THROUGHPUT TECHNOLOGIES AS DESCRIBED BY CMS-2020-01-R: HCPCS | Performed by: PHYSICIAN ASSISTANT

## 2021-10-04 PROCEDURE — U0005 INFEC AGEN DETEC AMPLI PROBE: HCPCS | Performed by: PHYSICIAN ASSISTANT

## 2021-10-11 ENCOUNTER — TELEMEDICINE (OUTPATIENT)
Dept: BEHAVIORAL/MENTAL HEALTH CLINIC | Facility: CLINIC | Age: 36
End: 2021-10-11

## 2021-10-11 DIAGNOSIS — F32.4 DEPRESSION, MAJOR, SINGLE EPISODE, IN PARTIAL REMISSION (HCC): Primary | ICD-10-CM

## 2021-10-11 DIAGNOSIS — F41.9 ANXIETY: ICD-10-CM

## 2021-12-07 DIAGNOSIS — B34.9 VIRAL INFECTION, UNSPECIFIED: Primary | ICD-10-CM

## 2021-12-09 ENCOUNTER — TELEMEDICINE (OUTPATIENT)
Dept: FAMILY MEDICINE CLINIC | Facility: HOME HEALTHCARE | Age: 36
End: 2021-12-09
Payer: COMMERCIAL

## 2021-12-09 DIAGNOSIS — J06.9 VIRAL URI WITH COUGH: Primary | ICD-10-CM

## 2021-12-09 PROCEDURE — G0071 COMM SVCS BY RHC/FQHC 5 MIN: HCPCS | Performed by: FAMILY MEDICINE

## 2021-12-09 RX ORDER — PSEUDOEPHEDRINE HCL 60 MG/1
60 TABLET ORAL EVERY 6 HOURS PRN
Qty: 30 TABLET | Refills: 0 | Status: SHIPPED | OUTPATIENT
Start: 2021-12-09

## 2022-01-01 DIAGNOSIS — F32.A ANXIETY AND DEPRESSION: ICD-10-CM

## 2022-01-01 DIAGNOSIS — F41.9 ANXIETY AND DEPRESSION: ICD-10-CM

## 2022-01-03 RX ORDER — CITALOPRAM 20 MG/1
TABLET ORAL
Qty: 30 TABLET | Refills: 5 | Status: SHIPPED | OUTPATIENT
Start: 2022-01-03 | End: 2022-07-18 | Stop reason: SDUPTHER

## 2022-01-06 ENCOUNTER — TELEPHONE (OUTPATIENT)
Dept: FAMILY MEDICINE CLINIC | Facility: CLINIC | Age: 37
End: 2022-01-06

## 2022-01-06 ENCOUNTER — TELEMEDICINE (OUTPATIENT)
Dept: FAMILY MEDICINE CLINIC | Facility: HOME HEALTHCARE | Age: 37
End: 2022-01-06
Payer: COMMERCIAL

## 2022-01-06 DIAGNOSIS — J30.9 ALLERGIC RHINITIS WITH POSTNASAL DRIP: ICD-10-CM

## 2022-01-06 DIAGNOSIS — R09.82 ALLERGIC RHINITIS WITH POSTNASAL DRIP: ICD-10-CM

## 2022-01-06 DIAGNOSIS — J02.9 PHARYNGITIS, UNSPECIFIED ETIOLOGY: ICD-10-CM

## 2022-01-06 DIAGNOSIS — J20.9 ACUTE BRONCHITIS, UNSPECIFIED ORGANISM: Primary | ICD-10-CM

## 2022-01-06 PROCEDURE — G0071 COMM SVCS BY RHC/FQHC 5 MIN: HCPCS | Performed by: FAMILY MEDICINE

## 2022-01-06 RX ORDER — FLUTICASONE PROPIONATE 50 MCG
2 SPRAY, SUSPENSION (ML) NASAL DAILY
Qty: 16 G | Refills: 5 | Status: SHIPPED | OUTPATIENT
Start: 2022-01-06 | End: 2022-02-23

## 2022-01-06 RX ORDER — AZITHROMYCIN 250 MG/1
TABLET, FILM COATED ORAL
Qty: 6 TABLET | Refills: 0 | Status: SHIPPED | OUTPATIENT
Start: 2022-01-06 | End: 2022-01-10

## 2022-01-06 NOTE — PROGRESS NOTES
Virtual Brief Visit    Patient is located in the following state in which I hold an active license [place]      Assessment/Plan:    Problem List Items Addressed This Visit     None      Visit Diagnoses     Acute bronchitis, unspecified organism    -  Primary    Relevant Medications    azithromycin (ZITHROMAX) 250 mg tablet    fluticasone (FLONASE) 50 mcg/act nasal spray    Allergic rhinitis with postnasal drip        Relevant Medications    fluticasone (FLONASE) 50 mcg/act nasal spray    Pharyngitis, unspecified etiology        Relevant Medications    azithromycin (ZITHROMAX) 250 mg tablet         81-year-old female for virtual visit today via telephone  Patient with persistent upper respiratory symptoms for several weeks  Positive significant postnasal drip with sore throat and enlarged glands in the back of her throat as per patient  Patient denies any fevers or chills  Denies any significant sinus pain or pressure  States however is coughing up thick green mucus  Patient does have concerns that there may be some mold in her basement  Is taking Pepcid and loratadine for frequent hives/urticaria  Denies any shortness of breath, chest pain, abdominal pain, nausea, vomiting, diarrhea, constipation  Positive occasional sneezing  Denies any new exposures otherwise  Will prescribe Flonase for what appears to be postnasal drip/ allergic rhinitis symptoms and Zithromax for pharyngitis and bronchitis symptoms as well  Patient instructed to call me with any questions or concerns  Instructed to call me with any persistent or worsening symptoms  Will have patient come in as soon as she can so I can evaluate and perform proper examination  Recent Visits  No visits were found meeting these conditions    Showing recent visits within past 7 days and meeting all other requirements  Today's Visits  Date Type Provider Dept   01/06/22 Johnny 95[facility] today's visits and meeting all other requirements  Future Appointments  No visits were found meeting these conditions    Showing future appointments within next 150 days and meeting all other requirements         I spent 15 minutes directly with the patient during this visit

## 2022-01-18 ENCOUNTER — TELEPHONE (OUTPATIENT)
Dept: INTERNAL MEDICINE CLINIC | Facility: OTHER | Age: 37
End: 2022-01-18

## 2022-01-18 NOTE — TELEPHONE ENCOUNTER
LCSW talked to client and client reported she was doing better overall and did not need to schedule a follow up  Client will discharge today and has crisis resources if needed

## 2022-01-31 ENCOUNTER — DOCUMENTATION (OUTPATIENT)
Dept: FAMILY MEDICINE CLINIC | Facility: CLINIC | Age: 37
End: 2022-01-31

## 2022-02-23 ENCOUNTER — OFFICE VISIT (OUTPATIENT)
Dept: FAMILY MEDICINE CLINIC | Facility: HOME HEALTHCARE | Age: 37
End: 2022-02-23
Payer: COMMERCIAL

## 2022-02-23 VITALS
TEMPERATURE: 97.6 F | SYSTOLIC BLOOD PRESSURE: 136 MMHG | RESPIRATION RATE: 16 BRPM | WEIGHT: 233.6 LBS | HEART RATE: 83 BPM | DIASTOLIC BLOOD PRESSURE: 88 MMHG | OXYGEN SATURATION: 98 % | BODY MASS INDEX: 37.54 KG/M2 | HEIGHT: 66 IN

## 2022-02-23 DIAGNOSIS — R09.81 NASAL CONGESTION WITH RHINORRHEA: Primary | ICD-10-CM

## 2022-02-23 DIAGNOSIS — J34.89 NASAL CONGESTION WITH RHINORRHEA: Primary | ICD-10-CM

## 2022-02-23 DIAGNOSIS — J31.0 RHINITIS, UNSPECIFIED TYPE: ICD-10-CM

## 2022-02-23 PROBLEM — Z13.21 SCREENING FOR ENDOCRINE, NUTRITIONAL, METABOLIC AND IMMUNITY DISORDER: Status: RESOLVED | Noted: 2021-04-22 | Resolved: 2022-02-23

## 2022-02-23 PROBLEM — Z13.0 SCREENING FOR ENDOCRINE, NUTRITIONAL, METABOLIC AND IMMUNITY DISORDER: Status: RESOLVED | Noted: 2021-04-22 | Resolved: 2022-02-23

## 2022-02-23 PROBLEM — Z13.228 SCREENING FOR ENDOCRINE, NUTRITIONAL, METABOLIC AND IMMUNITY DISORDER: Status: RESOLVED | Noted: 2021-04-22 | Resolved: 2022-02-23

## 2022-02-23 PROBLEM — Z13.29 SCREENING FOR ENDOCRINE, NUTRITIONAL, METABOLIC AND IMMUNITY DISORDER: Status: RESOLVED | Noted: 2021-04-22 | Resolved: 2022-02-23

## 2022-02-23 PROCEDURE — 99213 OFFICE O/P EST LOW 20 MIN: CPT | Performed by: FAMILY MEDICINE

## 2022-02-24 PROBLEM — B97.7 HUMAN PAPILLOMA VIRUS INFECTION: Status: ACTIVE | Noted: 2022-02-24

## 2022-02-24 PROBLEM — Z86.73 HISTORY OF TRANSIENT ISCHEMIC ATTACK: Status: ACTIVE | Noted: 2022-02-24

## 2022-03-17 ENCOUNTER — RA CDI HCC (OUTPATIENT)
Dept: OTHER | Facility: HOSPITAL | Age: 37
End: 2022-03-17

## 2022-03-17 NOTE — PROGRESS NOTES
[facility]  coding opportunities       Chart reviewed, no opportunity found: CHART REVIEWED, NO OPPORTUNITY FOUND        Patients Insurance        Commercial Insurance: [address] [place] [address]

## 2022-07-16 DIAGNOSIS — F32.A ANXIETY AND DEPRESSION: ICD-10-CM

## 2022-07-16 DIAGNOSIS — F41.9 ANXIETY AND DEPRESSION: ICD-10-CM

## 2022-07-18 RX ORDER — CITALOPRAM 20 MG/1
TABLET ORAL
Qty: 30 TABLET | Refills: 5 | Status: SHIPPED | OUTPATIENT
Start: 2022-07-18

## 2022-08-12 ENCOUNTER — TELEPHONE (OUTPATIENT)
Dept: FAMILY MEDICINE CLINIC | Facility: HOME HEALTHCARE | Age: 37
End: 2022-08-12

## 2022-08-18 DIAGNOSIS — F41.9 ANXIETY: Primary | ICD-10-CM

## 2022-08-18 RX ORDER — HYDROXYZINE HYDROCHLORIDE 10 MG/1
10 TABLET, FILM COATED ORAL EVERY 8 HOURS PRN
Qty: 90 TABLET | Refills: 1 | Status: SHIPPED | OUTPATIENT
Start: 2022-08-18

## 2022-10-21 DIAGNOSIS — K21.9 GASTROESOPHAGEAL REFLUX DISEASE WITHOUT ESOPHAGITIS: ICD-10-CM

## 2022-10-21 RX ORDER — OMEPRAZOLE 40 MG/1
40 CAPSULE, DELAYED RELEASE ORAL DAILY
Qty: 90 CAPSULE | Refills: 0 | Status: SHIPPED | OUTPATIENT
Start: 2022-10-21

## 2023-02-13 DIAGNOSIS — S86.811A STRAIN OF RIGHT CALF MUSCLE: Primary | ICD-10-CM

## 2023-02-16 ENCOUNTER — RA CDI HCC (OUTPATIENT)
Dept: OTHER | Facility: HOSPITAL | Age: 38
End: 2023-02-16

## 2023-02-16 NOTE — PROGRESS NOTES
[facility] 75  coding opportunities       Chart reviewed, no opportunity found: CHART REVIEWED, NO OPPORTUNITY FOUND        Patients Insurance        Commercial Insurance: [employer]

## 2023-10-18 NOTE — PROGRESS NOTES
October 18, 2023      [facility]  [address] [facility]  [address]  [place] [address]  Phone: [phone]  Fax: [phone]       Patient: Emily Montano   YOB: 2007  Date of Visit: 10/18/2023    To Whom It May Concern:    Ghazala Montano  was at [facility] on 10/18/2023. The patient may return to work/school on 10/19/2023 with no restrictions. If you have any questions or concerns, or if I can be of further assistance, please do not hesitate to contact me.    Sincerely,    Nancy Ferro MD      [address]       NAME: Margot Arita is a 31 y o  female  : 1985    MRN: [id number]  DATE: 2022  TIME: 8:59 AM    Assessment and Plan   Diagnoses and all orders for this visit:    Nasal congestion with rhinorrhea    Rhinitis, unspecified type        Patient has an appointment with Allergist in [place] today  Previous [facility] allergy panel was negative  Patient states has had skin testing in the past which was negative  Patient states symptoms have started since she moved to a new house that does have a damp basement and likely mold spores  Symptoms have been waxing and waning since October  Patient has been on loratadine, Flonase and taking Sudafed as well  I will await Allergy consult and appreciate their input  Patient scheduled for 1 month or sooner for her annual physical   Instructed to call any questions concerns      Chief Complaint     Chief Complaint   Patient presents with    Nasal Congestion     ongoing for months, multiple covid tests (negative)    Cough    Sore Throat         History of Present Illness       HPI   82-year-old female presents today complaining of frequent rhinorrhea, sneezing, cough, sore throat and occasional hives since October  Patient is taking Claritin, Flonase and Sudafed  Patient states when she takes Sudafed she does have some relief however the symptoms quickly returned  Has had [facility] allergy panel in the past I think around  which was negative  States has had skin testing in the past which was negative  Patient does have appointment today with allergy  Patient states notice symptoms after moving to new home /apartment with damp basement  Patient states does have electric baseboard heating and does not have forced hot air however possible still mold Spore exposure  Denies any work exposures  Review of Systems   Review of Systems   Constitutional: Negative for chills and fever     HENT: Positive for congestion, rhinorrhea, sinus pressure, sneezing and sore throat  Negative for sinus pain  Pressure in the ears   Eyes: Negative for discharge, redness and itching  Respiratory: Positive for cough  Cardiovascular: Negative  Gastrointestinal: Negative  Genitourinary: Negative  Musculoskeletal: Negative  Skin: Rash:  occasional hives  Neurological: Negative  Hematological: Negative  Psychiatric/Behavioral: Negative  All other systems reviewed and are negative          Current Medications       Current Outpatient Medications:     aspirin 81 mg chewable tablet, Chew 81 mg daily, Disp: , Rfl:     citalopram (CeleXA) 20 mg tablet, take 1 tablet by mouth once daily, Disp: 30 tablet, Rfl: 5    famotidine (PEPCID) 20 mg tablet, Take 1 tablet (20 mg total) by mouth 2 (two) times a day, Disp: 60 tablet, Rfl: 11    fluticasone (FLONASE) 50 mcg/act nasal spray, 2 sprays into each nostril daily, Disp: 16 g, Rfl: 5    loratadine (CLARITIN) 10 mg tablet, Take 1 tablet (10 mg total) by mouth daily, Disp: 30 tablet, Rfl: 11    omeprazole (PriLOSEC) 40 MG capsule, Take 1 capsule (40 mg total) by mouth daily, Disp: 90 capsule, Rfl: 0    pseudoephedrine (SUDAFED) 60 mg tablet, Take 1 tablet (60 mg total) by mouth every 6 (six) hours as needed for congestion (Patient not taking: Reported on 1/6/2022 ), Disp: 30 tablet, Rfl: 0    Current Allergies     Allergies as of 02/23/2022 - Reviewed 02/23/2022   Allergen Reaction Noted    Shellfish-derived products - food allergy Anaphylaxis 04/12/2019            The following portions of the patient's history were reviewed and updated as appropriate: allergies, current medications, past family history, past medical history, past social history, past surgical history and problem list      Past Medical History:   Diagnosis Date    Abnormal heart rhythm     Anxiety disorder 6/5/2015    Disease of thyroid gland     hashimotos    Headache     Heartburn  History of chickenpox     Hives     TIA (transient ischemic attack)        Past Surgical History:   Procedure Laterality Date    OTHER SURGICAL HISTORY      Lymphatic surgery    WISDOM TOOTH EXTRACTION         Family History   Problem Relation Age of Onset    Arthritis Mother     Thyroid cancer Mother     Arthritis Father     Thyroid cancer Maternal Aunt     Thyroid disease unspecified Sister     Rheum arthritis Sister     Thyroid disease unspecified Sister     Thyroid disease unspecified Sister          Medications have been verified  Objective   /88   Pulse 83   Temp 97 6 °F (36 4 °C)   Resp 16   Ht 5' 6" (1 676 m)   Wt 106 kg (233 lb 9 6 oz)   SpO2 98%   BMI 37 70 kg/m²        Physical Exam     Physical Exam  Vitals and nursing note reviewed  Constitutional:       General: She is not in acute distress  Appearance: She is not ill-appearing, toxic-appearing or diaphoretic  HENT:      Head: Normocephalic  Right Ear: Tympanic membrane normal       Left Ear: Tympanic membrane normal       Nose: Congestion and rhinorrhea present  Comments: Positive  Erythematous /irritated appearing nasal passages  Mouth/Throat:      Mouth: Mucous membranes are moist       Pharynx: Oropharynx is clear  Uvula midline  No oropharyngeal exudate, posterior oropharyngeal erythema or uvula swelling  Eyes:      Conjunctiva/sclera: Conjunctivae normal    Cardiovascular:      Rate and Rhythm: Normal rate and regular rhythm  Heart sounds: Normal heart sounds  No murmur heard  Pulmonary:      Effort: Pulmonary effort is normal  No respiratory distress  Breath sounds: Normal breath sounds  No wheezing or rales  Abdominal:      General: Bowel sounds are normal       Palpations: Abdomen is soft  Tenderness: There is no abdominal tenderness  Neurological:      Mental Status: She is alert

## 2025-07-08 ENCOUNTER — OFFICE VISIT (OUTPATIENT)
Dept: FAMILY MEDICINE CLINIC | Facility: CLINIC | Age: 40
End: 2025-07-08
Payer: COMMERCIAL

## 2025-07-08 VITALS
WEIGHT: 222 LBS | RESPIRATION RATE: 18 BRPM | SYSTOLIC BLOOD PRESSURE: 128 MMHG | TEMPERATURE: 97.6 F | HEIGHT: 66 IN | DIASTOLIC BLOOD PRESSURE: 78 MMHG | HEART RATE: 85 BPM | BODY MASS INDEX: 35.68 KG/M2 | OXYGEN SATURATION: 99 %

## 2025-07-08 DIAGNOSIS — E04.1 NONTOXIC SINGLE THYROID NODULE: ICD-10-CM

## 2025-07-08 DIAGNOSIS — E55.9 VITAMIN D DEFICIENCY: ICD-10-CM

## 2025-07-08 DIAGNOSIS — G43.109 COMPLICATED MIGRAINE: Primary | ICD-10-CM

## 2025-07-08 DIAGNOSIS — B37.9 CANDIDIASIS: ICD-10-CM

## 2025-07-08 DIAGNOSIS — Z00.00 ANNUAL PHYSICAL EXAM: ICD-10-CM

## 2025-07-08 PROBLEM — B97.7 HUMAN PAPILLOMA VIRUS INFECTION: Status: RESOLVED | Noted: 2022-02-24 | Resolved: 2025-07-08

## 2025-07-08 PROCEDURE — 90715 TDAP VACCINE 7 YRS/> IM: CPT

## 2025-07-08 PROCEDURE — 90471 IMMUNIZATION ADMIN: CPT

## 2025-07-08 PROCEDURE — 99395 PREV VISIT EST AGE 18-39: CPT

## 2025-07-08 RX ORDER — AMITRIPTYLINE HYDROCHLORIDE 10 MG/1
10 TABLET ORAL
Qty: 30 TABLET | Refills: 1 | Status: SHIPPED | OUTPATIENT
Start: 2025-07-08 | End: 2025-07-08 | Stop reason: CLARIF

## 2025-07-08 RX ORDER — TOPIRAMATE SPINKLE 25 MG/1
25 CAPSULE ORAL 2 TIMES DAILY
Qty: 60 CAPSULE | Refills: 1 | Status: SHIPPED | OUTPATIENT
Start: 2025-07-08

## 2025-07-08 RX ORDER — FLUCONAZOLE 150 MG/1
150 TABLET ORAL ONCE
Qty: 1 TABLET | Refills: 0 | Status: SHIPPED | OUTPATIENT
Start: 2025-07-08 | End: 2025-07-08

## 2025-07-08 RX ORDER — ONDANSETRON 4 MG/1
4 TABLET, FILM COATED ORAL EVERY 8 HOURS PRN
Qty: 20 TABLET | Refills: 0 | Status: SHIPPED | OUTPATIENT
Start: 2025-07-08

## 2025-07-08 NOTE — PATIENT INSTRUCTIONS
"Patient Education     Routine physical for adults   The Basics   Written by the doctors and editors at [facility]   What is a physical? -- A physical is a routine visit, or \"check-up,\" with your doctor. You might also hear it called a \"wellness visit\" or \"preventive visit.\"  During each visit, the doctor will:   Ask about your physical and mental health   Ask about your habits, behaviors, and lifestyle   Do an exam   Give you vaccines if needed   Talk to you about any medicines you take   Give advice about your health   Answer your questions  Getting regular check-ups is an important part of taking care of your health. It can help your doctor find and treat any problems you have. But it's also important for preventing health problems.  A routine physical is different from a \"sick visit.\" A sick visit is when you see a doctor because of a health concern or problem. Since physicals are scheduled ahead of time, you can think about what you want to ask the doctor.  How often should I get a physical? -- It depends on your age and health. In general, for people age 21 years and older:   If you are younger than 50 years, you might be able to get a physical every 3 years.   If you are 50 years or older, your doctor might recommend a physical every year.  If you have an ongoing health condition, like diabetes or high blood pressure, your doctor will probably want to see you more often.  What happens during a physical? -- In general, each visit will include:   Physical exam - The doctor or nurse will check your height, weight, heart rate, and blood pressure. They will also look at your eyes and ears. They will ask about how you are feeling and whether you have any symptoms that bother you.   Medicines - It's a good idea to bring a list of all the medicines you take to each doctor visit. Your doctor will talk to you about your medicines and answer any questions. Tell them if you are having any side effects that bother you. You " "should also tell them if you are having trouble paying for any of your medicines.   Habits and behaviors - This includes:   Your diet   Your exercise habits   Whether you smoke, drink alcohol, or use drugs   Whether you are sexually active   Whether you feel safe at home  Your doctor will talk to you about things you can do to improve your health and lower your risk of health problems. They will also offer help and support. For example, if you want to quit smoking, they can give you advice and might prescribe medicines. If you want to improve your diet or get more physical activity, they can help you with this, too.   Lab tests, if needed - The tests you get will depend on your age and situation. For example, your doctor might want to check your:   Cholesterol   Blood sugar   Iron level   Vaccines - The recommended vaccines will depend on your age, health, and what vaccines you already had. Vaccines are very important because they can prevent certain serious or deadly infections.   Discussion of screening - \"Screening\" means checking for diseases or other health problems before they cause symptoms. Your doctor can recommend screening based on your age, risk, and preferences. This might include tests to check for:   Cancer, such as breast, prostate, cervical, ovarian, colorectal, prostate, lung, or skin cancer   Sexually transmitted infections, such as chlamydia and gonorrhea   Mental health conditions like depression and anxiety  Your doctor will talk to you about the different types of screening tests. They can help you decide which screenings to have. They can also explain what the results might mean.   Answering questions - The physical is a good time to ask the doctor or nurse questions about your health. If needed, they can refer you to other doctors or specialists, too.  Adults older than 65 years often need other care, too. As you get older, your doctor will talk to you about:   How to prevent falling at " home   Hearing or vision tests   Memory testing   How to take your medicines safely   Making sure that you have the help and support you need at home  All topics are updated as new evidence becomes available and our peer review process is complete.  This topic retrieved from [employer] on: May 02, 2024.  Topic 018637 Version 1.0  Release: 32.4.3 - C32.122  © 2024 UpToDate, Inc. and/or its affiliates. All rights reserved.  Consumer Information Use and Disclaimer   Disclaimer: This generalized information is a limited summary of diagnosis, treatment, and/or medication information. It is not meant to be comprehensive and should be used as a tool to help the user understand and/or assess potential diagnostic and treatment options. It does NOT include all information about conditions, treatments, medications, side effects, or risks that may apply to a specific patient. It is not intended to be medical advice or a substitute for the medical advice, diagnosis, or treatment of a health care provider based on the health care provider's examination and assessment of a patient's specific and unique circumstances. Patients must speak with a health care provider for complete information about their health, medical questions, and treatment options, including any risks or benefits regarding use of medications. This information does not endorse any treatments or medications as safe, effective, or approved for treating a specific patient. UpToDate, Inc. and its affiliates disclaim any warranty or liability relating to this information or the use thereof.The use of this information is governed by the Terms of Use, available at https://www.[employer].com/en/know/clinical-effectiveness-terms. 2024© UpToDate, Inc. and its affiliates and/or licensors. All rights reserved.  Copyright   © 2024 UpToDate, Inc. and/or its affiliates. All rights reserved.

## 2025-07-08 NOTE — ASSESSMENT & PLAN NOTE
Orders:    TSH, 3rd generation with Free T4 reflex; Future    Anti-microsomal antibody; Future

## 2025-07-08 NOTE — ASSESSMENT & PLAN NOTE
Orders:    ondansetron (ZOFRAN) 4 mg tablet; Take 1 tablet (4 mg total) by mouth every 8 (eight) hours as needed for nausea or vomiting    topiramate (TOPAMAX) 25 mg sprinkle capsule; Take 1 capsule (25 mg total) by mouth 2 (two) times a day

## 2025-07-08 NOTE — PROGRESS NOTES
Adult Annual Physical  Name: Hermelinda Brooks      : 1985      MRN: [id number]  Encounter Provider: Kimberly Chu MD  Encounter Date: 2025   Encounter department: [facility]    :  Assessment & Plan  Complicated migraine    Orders:    ondansetron (ZOFRAN) 4 mg tablet; Take 1 tablet (4 mg total) by mouth every 8 (eight) hours as needed for nausea or vomiting    topiramate (TOPAMAX) 25 mg sprinkle capsule; Take 1 capsule (25 mg total) by mouth 2 (two) times a day    Vitamin D deficiency    Orders:    Vitamin D 1,25 Dihydroxy; Future    Vaginal Candidiasis    Orders:    fluconazole (DIFLUCAN) 150 mg tablet; Take 1 tablet (150 mg total) by mouth once for 1 dose    Nontoxic single thyroid nodule    Orders:    TSH, 3rd generation with Free T4 reflex; Future    Anti-microsomal antibody; Future    Annual physical exam    Orders:    Tdap vaccine greater than or equal to 8yo IM    Hepatitis C antibody; Future    Comprehensive metabolic panel; Future    CBC and differential; Future    Lipid panel; Future           Annual physical exam       Annual Physical Plan    Depression Screening and Follow-up Plan: Patient was screened for depression during today's encounter. They screened negative with a PHQ-9 score of 0.          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 She is not due for cervical cytology at this time.    We discussed her migraines, which she reports are hormonally triggered, typically occurring at the cusp of each menstrual cycle and accompanied by nausea. She is currently not on any preventive or abortive migraine therapy. After reviewing options, we agreed to initiate topiramate, which may assist in migraine prevention and may offer modest weight loss benefits--an additional goal for the  "patient given her elevated BMI.    The patient also has a patent foramen ovale (PFO) previously followed by cardiology after an episode characterized by unilateral numbness (face, arm, and leg) and visual changes. The event raised concern for a possible TIA or migraine aura, patient reported that neurology concluded it was unlikely to be a stroke. She reports that cardiology discussed surgical closure of the PFO, but after reviewing the risk-benefit profile, she elected not to proceed with intervention. Patient also reported a yeast vaginal infection like symptoms and requested medication, patient declined testing or examination.     Adult Annual Physical:  Patient presents for annual physical.     Diet and Physical Activity:  - Diet/Nutrition: no special diet.  - Exercise: no formal exercise.    Depression Screening:    - PHQ-9 Score: 0    General Health:    - Hearing: normal hearing right ear.  - Vision: no vision problems.    /GYN Health:  - Follows with GYN: no.   - Menopause: premenopausal.     Advanced Care Planning:  - Has an advanced directive?: no    - Has a durable medical POA?: no    - ACP document given to patient?: no      Review of Systems   Constitutional:  Negative for chills and fever.   HENT:  Negative for ear pain and sore throat.    Eyes:  Negative for pain and visual disturbance.   Respiratory:  Negative for cough and shortness of breath.    Cardiovascular:  Negative for chest pain and palpitations.   Gastrointestinal:  Negative for abdominal pain and vomiting.   Genitourinary:  Negative for dysuria and hematuria.   Musculoskeletal:  Negative for arthralgias and back pain.   Skin:  Negative for color change and rash.   Neurological:  Negative for seizures and syncope.   All other systems reviewed and are negative.        Objective   /78 (BP Location: Left arm, Patient Position: Sitting, Cuff Size: Large)   Pulse 85   Temp 97.6 °F (36.4 °C) (Temporal)   Resp 18   Ht 5' 6\" (1.676 m)   " Wt 101 kg (222 lb) Comment: Provided by pt  LMP  (LMP Unknown)   SpO2 99%   BMI 35.83 kg/m²     Physical Exam  Vitals and nursing note reviewed.   Constitutional:       General: She is not in acute distress.     Appearance: She is well-developed.   HENT:      Head: Normocephalic and atraumatic.     Eyes:      Conjunctiva/sclera: Conjunctivae normal.       Cardiovascular:      Rate and Rhythm: Normal rate and regular rhythm.      Heart sounds: No murmur heard.  Pulmonary:      Effort: Pulmonary effort is normal. No respiratory distress.      Breath sounds: Normal breath sounds.   Abdominal:      Palpations: Abdomen is soft.      Tenderness: There is no abdominal tenderness.     Musculoskeletal:         General: No swelling.      Cervical back: Neck supple.     Skin:     General: Skin is warm and dry.      Capillary Refill: Capillary refill takes less than 2 seconds.     Neurological:      Mental Status: She is alert.     Psychiatric:         Mood and Affect: Mood normal.

## 2025-07-10 ENCOUNTER — APPOINTMENT (OUTPATIENT)
Dept: LAB | Facility: CLINIC | Age: 40
End: 2025-07-10
Payer: COMMERCIAL

## 2025-07-10 DIAGNOSIS — Z00.00 ANNUAL PHYSICAL EXAM: ICD-10-CM

## 2025-07-10 DIAGNOSIS — E55.9 VITAMIN D DEFICIENCY: ICD-10-CM

## 2025-07-10 DIAGNOSIS — E04.1 NONTOXIC SINGLE THYROID NODULE: ICD-10-CM

## 2025-07-10 LAB
ALBUMIN SERPL BCG-MCNC: 4.3 G/DL (ref 3.5–5)
ALP SERPL-CCNC: 58 U/L (ref 34–104)
ALT SERPL W P-5'-P-CCNC: 23 U/L (ref 7–52)
ANION GAP SERPL CALCULATED.3IONS-SCNC: 11 MMOL/L (ref 4–13)
AST SERPL W P-5'-P-CCNC: 23 U/L (ref 13–39)
BASOPHILS # BLD AUTO: 0.04 THOUSANDS/ÂΜL (ref 0–0.1)
BASOPHILS NFR BLD AUTO: 1 % (ref 0–1)
BILIRUB SERPL-MCNC: 0.55 MG/DL (ref 0.2–1)
BUN SERPL-MCNC: 10 MG/DL (ref 5–25)
CALCIUM SERPL-MCNC: 9.1 MG/DL (ref 8.4–10.2)
CHLORIDE SERPL-SCNC: 103 MMOL/L (ref 96–108)
CHOLEST SERPL-MCNC: 207 MG/DL (ref ?–200)
CO2 SERPL-SCNC: 24 MMOL/L (ref 21–32)
CREAT SERPL-MCNC: 0.72 MG/DL (ref 0.6–1.3)
EOSINOPHIL # BLD AUTO: 0.09 THOUSAND/ÂΜL (ref 0–0.61)
EOSINOPHIL NFR BLD AUTO: 1 % (ref 0–6)
ERYTHROCYTE [DISTWIDTH] IN BLOOD BY AUTOMATED COUNT: 12.2 % (ref 11.6–15.1)
GFR SERPL CREATININE-BSD FRML MDRD: 106 ML/MIN/1.73SQ M
GLUCOSE P FAST SERPL-MCNC: 76 MG/DL (ref 65–99)
HCT VFR BLD AUTO: 44.8 % (ref 34.8–46.1)
HDLC SERPL-MCNC: 45 MG/DL
HGB BLD-MCNC: 14.4 G/DL (ref 11.5–15.4)
IMM GRANULOCYTES # BLD AUTO: 0.02 THOUSAND/UL (ref 0–0.2)
IMM GRANULOCYTES NFR BLD AUTO: 0 % (ref 0–2)
LDLC SERPL CALC-MCNC: 144 MG/DL (ref 0–100)
LYMPHOCYTES # BLD AUTO: 2.41 THOUSANDS/ÂΜL (ref 0.6–4.47)
LYMPHOCYTES NFR BLD AUTO: 35 % (ref 14–44)
MCH RBC QN AUTO: 29.8 PG (ref 26.8–34.3)
MCHC RBC AUTO-ENTMCNC: 32.1 G/DL (ref 31.4–37.4)
MCV RBC AUTO: 93 FL (ref 82–98)
MONOCYTES # BLD AUTO: 0.59 THOUSAND/ÂΜL (ref 0.17–1.22)
MONOCYTES NFR BLD AUTO: 9 % (ref 4–12)
NEUTROPHILS # BLD AUTO: 3.74 THOUSANDS/ÂΜL (ref 1.85–7.62)
NEUTS SEG NFR BLD AUTO: 54 % (ref 43–75)
NONHDLC SERPL-MCNC: 162 MG/DL
NRBC BLD AUTO-RTO: 0 /100 WBCS
PLATELET # BLD AUTO: 326 THOUSANDS/UL (ref 149–390)
PMV BLD AUTO: 10.6 FL (ref 8.9–12.7)
POTASSIUM SERPL-SCNC: 3.9 MMOL/L (ref 3.5–5.3)
PROT SERPL-MCNC: 7.5 G/DL (ref 6.4–8.4)
RBC # BLD AUTO: 4.84 MILLION/UL (ref 3.81–5.12)
SODIUM SERPL-SCNC: 138 MMOL/L (ref 135–147)
TRIGL SERPL-MCNC: 89 MG/DL (ref ?–150)
TSH SERPL DL<=0.05 MIU/L-ACNC: 1.25 UIU/ML (ref 0.45–4.5)
WBC # BLD AUTO: 6.89 THOUSAND/UL (ref 4.31–10.16)

## 2025-07-10 PROCEDURE — 85025 COMPLETE CBC W/AUTO DIFF WBC: CPT

## 2025-07-10 PROCEDURE — 36415 COLL VENOUS BLD VENIPUNCTURE: CPT

## 2025-07-10 PROCEDURE — 82652 VIT D 1 25-DIHYDROXY: CPT

## 2025-07-10 PROCEDURE — 86376 MICROSOMAL ANTIBODY EACH: CPT

## 2025-07-10 PROCEDURE — 84443 ASSAY THYROID STIM HORMONE: CPT

## 2025-07-10 PROCEDURE — 80053 COMPREHEN METABOLIC PANEL: CPT

## 2025-07-10 PROCEDURE — 86803 HEPATITIS C AB TEST: CPT

## 2025-07-10 PROCEDURE — 80061 LIPID PANEL: CPT

## 2025-07-11 LAB
HCV AB SER QL: NORMAL
THYROPEROXIDASE AB SERPL-ACNC: 28 IU/ML (ref 0–34)

## 2025-07-14 LAB — 1,25(OH)2D SERPL-MCNC: 42.5 PG/ML (ref 5–200)
